# Patient Record
Sex: FEMALE | Race: WHITE | NOT HISPANIC OR LATINO | Employment: OTHER | URBAN - METROPOLITAN AREA
[De-identification: names, ages, dates, MRNs, and addresses within clinical notes are randomized per-mention and may not be internally consistent; named-entity substitution may affect disease eponyms.]

---

## 2017-01-09 ENCOUNTER — HISTORICAL (OUTPATIENT)
Dept: LAB | Facility: HOSPITAL | Age: 67
End: 2017-01-09

## 2017-07-25 ENCOUNTER — HISTORICAL (OUTPATIENT)
Dept: LAB | Facility: HOSPITAL | Age: 67
End: 2017-07-25

## 2017-07-25 LAB
ABS NEUT (OLG): 3.95 X10(3)/MCL (ref 2.1–9.2)
BASOPHILS # BLD AUTO: 0 X10(3)/MCL (ref 0–0.2)
BASOPHILS NFR BLD AUTO: 0 %
DEPRECATED CALCIDIOL+CALCIFEROL SERPL-MC: 47.36 NG/ML (ref 30–80)
EOSINOPHIL # BLD AUTO: 0.3 X10(3)/MCL (ref 0–0.9)
EOSINOPHIL NFR BLD AUTO: 5 %
ERYTHROCYTE [DISTWIDTH] IN BLOOD BY AUTOMATED COUNT: 14 % (ref 11.5–17)
HCT VFR BLD AUTO: 39.1 % (ref 37–47)
HGB BLD-MCNC: 12.2 GM/DL (ref 12–16)
LYMPHOCYTES # BLD AUTO: 1.5 X10(3)/MCL (ref 0.6–4.6)
LYMPHOCYTES NFR BLD AUTO: 23 %
MCH RBC QN AUTO: 29.3 PG (ref 27–31)
MCHC RBC AUTO-ENTMCNC: 31.2 GM/DL (ref 33–36)
MCV RBC AUTO: 94 FL (ref 80–94)
MONOCYTES # BLD AUTO: 0.6 X10(3)/MCL (ref 0.1–1.3)
MONOCYTES NFR BLD AUTO: 9 %
NEUTROPHILS # BLD AUTO: 3.95 X10(3)/MCL (ref 2.1–9.2)
NEUTROPHILS NFR BLD AUTO: 62 %
PLATELET # BLD AUTO: 264 X10(3)/MCL (ref 130–400)
PMV BLD AUTO: 9.7 FL (ref 9.4–12.4)
RBC # BLD AUTO: 4.16 X10(6)/MCL (ref 4.2–5.4)
TSH SERPL-ACNC: 3.34 MIU/ML (ref 0.36–3.74)
WBC # SPEC AUTO: 6.4 X10(3)/MCL (ref 4.5–11.5)

## 2018-12-12 ENCOUNTER — HISTORICAL (OUTPATIENT)
Dept: LAB | Facility: HOSPITAL | Age: 68
End: 2018-12-12

## 2018-12-12 LAB — DEPRECATED CALCIDIOL+CALCIFEROL SERPL-MC: 70.04 NG/ML (ref 30–80)

## 2019-07-22 ENCOUNTER — HISTORICAL (OUTPATIENT)
Dept: LAB | Facility: HOSPITAL | Age: 69
End: 2019-07-22

## 2019-07-22 LAB
ABS NEUT (OLG): 4.17 X10(3)/MCL (ref 2.1–9.2)
BASOPHILS # BLD AUTO: 0 X10(3)/MCL (ref 0–0.2)
BASOPHILS NFR BLD AUTO: 1 %
DEPRECATED CALCIDIOL+CALCIFEROL SERPL-MC: 57.88 NG/ML (ref 30–80)
EOSINOPHIL # BLD AUTO: 0.3 X10(3)/MCL (ref 0–0.9)
EOSINOPHIL NFR BLD AUTO: 4 %
ERYTHROCYTE [DISTWIDTH] IN BLOOD BY AUTOMATED COUNT: 13.5 % (ref 11.5–17)
HCT VFR BLD AUTO: 43.2 % (ref 37–47)
HGB BLD-MCNC: 13.3 GM/DL (ref 12–16)
LYMPHOCYTES # BLD AUTO: 1.7 X10(3)/MCL (ref 0.6–4.6)
LYMPHOCYTES NFR BLD AUTO: 25 %
MCH RBC QN AUTO: 29.3 PG (ref 27–31)
MCHC RBC AUTO-ENTMCNC: 30.8 GM/DL (ref 33–36)
MCV RBC AUTO: 95.2 FL (ref 80–94)
MONOCYTES # BLD AUTO: 0.6 X10(3)/MCL (ref 0.1–1.3)
MONOCYTES NFR BLD AUTO: 8 %
NEUTROPHILS # BLD AUTO: 4.17 X10(3)/MCL (ref 2.1–9.2)
NEUTROPHILS NFR BLD AUTO: 62 %
PLATELET # BLD AUTO: 246 X10(3)/MCL (ref 130–400)
PMV BLD AUTO: 9.5 FL (ref 9.4–12.4)
RBC # BLD AUTO: 4.54 X10(6)/MCL (ref 4.2–5.4)
TSH SERPL-ACNC: 1.09 MIU/L (ref 0.36–3.74)
WBC # SPEC AUTO: 6.7 X10(3)/MCL (ref 4.5–11.5)

## 2019-09-25 LAB — CRC RECOMMENDATION EXT: NORMAL

## 2019-11-07 ENCOUNTER — HISTORICAL (OUTPATIENT)
Dept: LAB | Facility: HOSPITAL | Age: 69
End: 2019-11-07

## 2019-11-07 LAB — DEPRECATED CALCIDIOL+CALCIFEROL SERPL-MC: 67.33 NG/ML (ref 30–80)

## 2020-04-22 ENCOUNTER — HISTORICAL (OUTPATIENT)
Dept: PHYSICAL THERAPY | Facility: HOSPITAL | Age: 70
End: 2020-04-22

## 2020-04-24 ENCOUNTER — HISTORICAL (OUTPATIENT)
Dept: PHYSICAL THERAPY | Facility: HOSPITAL | Age: 70
End: 2020-04-24

## 2020-04-27 ENCOUNTER — HISTORICAL (OUTPATIENT)
Dept: PHYSICAL THERAPY | Facility: HOSPITAL | Age: 70
End: 2020-04-27

## 2020-05-01 ENCOUNTER — HISTORICAL (OUTPATIENT)
Dept: PHYSICAL THERAPY | Facility: HOSPITAL | Age: 70
End: 2020-05-01

## 2020-05-06 ENCOUNTER — HISTORICAL (OUTPATIENT)
Dept: PHYSICAL THERAPY | Facility: HOSPITAL | Age: 70
End: 2020-05-06

## 2020-05-08 ENCOUNTER — HISTORICAL (OUTPATIENT)
Dept: PHYSICAL THERAPY | Facility: HOSPITAL | Age: 70
End: 2020-05-08

## 2020-05-13 ENCOUNTER — HISTORICAL (OUTPATIENT)
Dept: PHYSICAL THERAPY | Facility: HOSPITAL | Age: 70
End: 2020-05-13

## 2020-05-15 ENCOUNTER — HISTORICAL (OUTPATIENT)
Dept: PHYSICAL THERAPY | Facility: HOSPITAL | Age: 70
End: 2020-05-15

## 2020-05-20 ENCOUNTER — HISTORICAL (OUTPATIENT)
Dept: PHYSICAL THERAPY | Facility: HOSPITAL | Age: 70
End: 2020-05-20

## 2020-05-22 ENCOUNTER — HISTORICAL (OUTPATIENT)
Dept: PHYSICAL THERAPY | Facility: HOSPITAL | Age: 70
End: 2020-05-22

## 2020-05-27 ENCOUNTER — HISTORICAL (OUTPATIENT)
Dept: PHYSICAL THERAPY | Facility: HOSPITAL | Age: 70
End: 2020-05-27

## 2020-05-29 ENCOUNTER — HISTORICAL (OUTPATIENT)
Dept: PHYSICAL THERAPY | Facility: HOSPITAL | Age: 70
End: 2020-05-29

## 2020-07-23 ENCOUNTER — HISTORICAL (OUTPATIENT)
Dept: ADMINISTRATIVE | Facility: HOSPITAL | Age: 70
End: 2020-07-23

## 2020-07-23 LAB
ABS NEUT (OLG): 3.71 X10(3)/MCL (ref 2.1–9.2)
BASOPHILS # BLD AUTO: 0 X10(3)/MCL (ref 0–0.2)
BASOPHILS NFR BLD AUTO: 0 %
EOSINOPHIL # BLD AUTO: 0.4 X10(3)/MCL (ref 0–0.9)
EOSINOPHIL NFR BLD AUTO: 6 %
ERYTHROCYTE [DISTWIDTH] IN BLOOD BY AUTOMATED COUNT: 13.3 % (ref 11.5–17)
HCT VFR BLD AUTO: 40.1 % (ref 37–47)
HGB BLD-MCNC: 12.6 GM/DL (ref 12–16)
LYMPHOCYTES # BLD AUTO: 1.6 X10(3)/MCL (ref 0.6–4.6)
LYMPHOCYTES NFR BLD AUTO: 25 %
MCH RBC QN AUTO: 30.2 PG (ref 27–31)
MCHC RBC AUTO-ENTMCNC: 31.4 GM/DL (ref 33–36)
MCV RBC AUTO: 96.2 FL (ref 80–94)
MONOCYTES # BLD AUTO: 0.6 X10(3)/MCL (ref 0.1–1.3)
MONOCYTES NFR BLD AUTO: 9 %
NEUTROPHILS # BLD AUTO: 3.71 X10(3)/MCL (ref 2.1–9.2)
NEUTROPHILS NFR BLD AUTO: 59 %
PLATELET # BLD AUTO: 239 X10(3)/MCL (ref 130–400)
PMV BLD AUTO: 9.7 FL (ref 9.4–12.4)
RBC # BLD AUTO: 4.17 X10(6)/MCL (ref 4.2–5.4)
TSH SERPL-ACNC: 0.64 UIU/ML (ref 0.35–4.94)
WBC # SPEC AUTO: 6.2 X10(3)/MCL (ref 4.5–11.5)

## 2022-07-20 RX ORDER — INFLUENZA A VIRUS A/NEBRASKA/14/2019 (H1N1) ANTIGEN (MDCK CELL DERIVED, PROPIOLACTONE INACTIVATED), INFLUENZA A VIRUS A/DELAWARE/39/2019 (H3N2) ANTIGEN (MDCK CELL DERIVED, PROPIOLACTONE INACTIVATED), INFLUENZA B VIRUS B/SINGAPORE/INFTT-16-0610/2016 ANTIGEN (MDCK CELL DERIVED, PROPIOLACTONE INACTIVATED), INFLUENZA B VIRUS B/DARWIN/7/2019 ANTIGEN (MDCK CELL DERIVED, PROPIOLACTONE INACTIVATED) 15; 15; 15; 15 UG/.5ML; UG/.5ML; UG/.5ML; UG/.5ML
INJECTION, SUSPENSION INTRAMUSCULAR
COMMUNITY
End: 2023-06-08 | Stop reason: ALTCHOICE

## 2022-07-20 RX ORDER — ROSUVASTATIN CALCIUM 10 MG/1
TABLET, COATED ORAL
COMMUNITY
End: 2023-02-16 | Stop reason: SDUPTHER

## 2022-07-20 RX ORDER — CARBOXYMETHYLCELLULOSE SODIUM 5 MG/ML
SOLUTION/ DROPS OPHTHALMIC
COMMUNITY

## 2022-07-20 RX ORDER — LEVOTHYROXINE SODIUM 100 UG/1
TABLET ORAL
COMMUNITY
End: 2022-08-16 | Stop reason: SDUPTHER

## 2022-07-20 RX ORDER — ESTRADIOL 1 MG/1
TABLET ORAL
COMMUNITY
End: 2023-06-08

## 2022-07-20 RX ORDER — OMEPRAZOLE 20 MG/1
TABLET, DELAYED RELEASE ORAL
COMMUNITY
End: 2023-11-26

## 2022-07-25 ENCOUNTER — APPOINTMENT (OUTPATIENT)
Dept: LAB | Facility: HOSPITAL | Age: 72
End: 2022-07-25
Attending: FAMILY MEDICINE
Payer: MEDICARE

## 2022-07-25 DIAGNOSIS — E78.00 HYPERCHOLESTEREMIA: ICD-10-CM

## 2022-07-25 DIAGNOSIS — R73.03 PRE-DIABETES: ICD-10-CM

## 2022-07-25 DIAGNOSIS — Z00.00 ROUTINE GENERAL MEDICAL EXAMINATION AT A HEALTH CARE FACILITY: Primary | ICD-10-CM

## 2022-07-25 DIAGNOSIS — E55.9 VITAMIN D DEFICIENCY: ICD-10-CM

## 2022-07-25 DIAGNOSIS — E03.4 IDIOPATHIC ATROPHIC HYPOTHYROIDISM: ICD-10-CM

## 2022-07-25 LAB
ALBUMIN SERPL-MCNC: 3.4 GM/DL (ref 3.4–4.8)
ALBUMIN/GLOB SERPL: 1.4 RATIO (ref 1.1–2)
ALP SERPL-CCNC: 68 UNIT/L (ref 40–150)
ALT SERPL-CCNC: 14 UNIT/L (ref 0–55)
APPEARANCE UR: CLEAR
AST SERPL-CCNC: 13 UNIT/L (ref 5–34)
BACTERIA #/AREA URNS AUTO: ABNORMAL /HPF
BASOPHILS # BLD AUTO: 0.04 X10(3)/MCL (ref 0–0.2)
BASOPHILS NFR BLD AUTO: 0.6 %
BILIRUB UR QL STRIP.AUTO: NEGATIVE MG/DL
BILIRUBIN DIRECT+TOT PNL SERPL-MCNC: 0.5 MG/DL
BUN SERPL-MCNC: 16 MG/DL (ref 9.8–20.1)
CALCIUM SERPL-MCNC: 9.1 MG/DL (ref 8.4–10.2)
CHLORIDE SERPL-SCNC: 107 MMOL/L (ref 98–107)
CHOLEST SERPL-MCNC: 153 MG/DL
CHOLEST/HDLC SERPL: 3 {RATIO} (ref 0–5)
CO2 SERPL-SCNC: 23 MMOL/L (ref 23–31)
COLOR UR AUTO: YELLOW
CREAT SERPL-MCNC: 0.87 MG/DL (ref 0.55–1.02)
DEPRECATED CALCIDIOL+CALCIFEROL SERPL-MC: 83.8 NG/ML (ref 30–80)
EOSINOPHIL # BLD AUTO: 0.52 X10(3)/MCL (ref 0–0.9)
EOSINOPHIL NFR BLD AUTO: 7.9 %
ERYTHROCYTE [DISTWIDTH] IN BLOOD BY AUTOMATED COUNT: 13.5 % (ref 11.5–17)
EST. AVERAGE GLUCOSE BLD GHB EST-MCNC: 137 MG/DL
GLOBULIN SER-MCNC: 2.5 GM/DL (ref 2.4–3.5)
GLUCOSE SERPL-MCNC: 102 MG/DL (ref 82–115)
GLUCOSE UR QL STRIP.AUTO: NEGATIVE MG/DL
HBA1C MFR BLD: 6.4 %
HCT VFR BLD AUTO: 37.9 % (ref 37–47)
HDLC SERPL-MCNC: 57 MG/DL (ref 35–60)
HGB BLD-MCNC: 12.1 GM/DL (ref 12–16)
IMM GRANULOCYTES # BLD AUTO: 0.02 X10(3)/MCL (ref 0–0.04)
IMM GRANULOCYTES NFR BLD AUTO: 0.3 %
KETONES UR QL STRIP.AUTO: NEGATIVE MG/DL
LDLC SERPL CALC-MCNC: 71 MG/DL (ref 50–140)
LEUKOCYTE ESTERASE UR QL STRIP.AUTO: NEGATIVE UNIT/L
LYMPHOCYTES # BLD AUTO: 1.45 X10(3)/MCL (ref 0.6–4.6)
LYMPHOCYTES NFR BLD AUTO: 22 %
MCH RBC QN AUTO: 30.5 PG (ref 27–31)
MCHC RBC AUTO-ENTMCNC: 31.9 MG/DL (ref 33–36)
MCV RBC AUTO: 95.5 FL (ref 80–94)
MONOCYTES # BLD AUTO: 0.51 X10(3)/MCL (ref 0.1–1.3)
MONOCYTES NFR BLD AUTO: 7.8 %
NEUTROPHILS # BLD AUTO: 4 X10(3)/MCL (ref 2.1–9.2)
NEUTROPHILS NFR BLD AUTO: 61.4 %
NITRITE UR QL STRIP.AUTO: NEGATIVE
NRBC BLD AUTO-RTO: 0 %
PH UR STRIP.AUTO: 7 [PH]
PLATELET # BLD AUTO: 223 X10(3)/MCL (ref 130–400)
PMV BLD AUTO: 9.7 FL (ref 7.4–10.4)
POTASSIUM SERPL-SCNC: 4.3 MMOL/L (ref 3.5–5.1)
PROT SERPL-MCNC: 5.9 GM/DL (ref 5.8–7.6)
PROT UR QL STRIP.AUTO: NEGATIVE MG/DL
RBC # BLD AUTO: 3.97 X10(6)/MCL (ref 4.2–5.4)
RBC #/AREA URNS AUTO: <5 /HPF
RBC UR QL AUTO: NEGATIVE UNIT/L
SODIUM SERPL-SCNC: 141 MMOL/L (ref 136–145)
SP GR UR STRIP.AUTO: 1.01 (ref 1–1.03)
SQUAMOUS #/AREA URNS AUTO: 6 /HPF
T3FREE SERPL-MCNC: 2.5 PG/ML (ref 1.57–3.91)
T4 FREE SERPL-MCNC: 0.92 NG/DL (ref 0.7–1.48)
TRIGL SERPL-MCNC: 123 MG/DL (ref 37–140)
TSH SERPL-ACNC: 1.12 UIU/ML (ref 0.35–4.94)
UROBILINOGEN UR STRIP-ACNC: 0.2 MG/DL
VLDLC SERPL CALC-MCNC: 25 MG/DL
WBC # SPEC AUTO: 6.6 X10(3)/MCL (ref 4.5–11.5)
WBC #/AREA URNS AUTO: <5 /HPF

## 2022-07-25 PROCEDURE — 80061 LIPID PANEL: CPT

## 2022-07-25 PROCEDURE — 85025 COMPLETE CBC W/AUTO DIFF WBC: CPT

## 2022-07-25 PROCEDURE — 84439 ASSAY OF FREE THYROXINE: CPT

## 2022-07-25 PROCEDURE — 84443 ASSAY THYROID STIM HORMONE: CPT

## 2022-07-25 PROCEDURE — 81001 URINALYSIS AUTO W/SCOPE: CPT

## 2022-07-25 PROCEDURE — 82306 VITAMIN D 25 HYDROXY: CPT

## 2022-07-25 PROCEDURE — 80053 COMPREHEN METABOLIC PANEL: CPT

## 2022-07-25 PROCEDURE — 36415 COLL VENOUS BLD VENIPUNCTURE: CPT

## 2022-07-25 PROCEDURE — 83036 HEMOGLOBIN GLYCOSYLATED A1C: CPT

## 2022-07-25 PROCEDURE — 84481 FREE ASSAY (FT-3): CPT

## 2022-08-02 ENCOUNTER — OFFICE VISIT (OUTPATIENT)
Dept: PRIMARY CARE CLINIC | Facility: CLINIC | Age: 72
End: 2022-08-02
Payer: MEDICARE

## 2022-08-02 VITALS
DIASTOLIC BLOOD PRESSURE: 78 MMHG | OXYGEN SATURATION: 96 % | WEIGHT: 147 LBS | HEART RATE: 74 BPM | SYSTOLIC BLOOD PRESSURE: 144 MMHG

## 2022-08-02 DIAGNOSIS — Z00.00 WELLNESS EXAMINATION: ICD-10-CM

## 2022-08-02 DIAGNOSIS — R73.02 IGT (IMPAIRED GLUCOSE TOLERANCE): Primary | ICD-10-CM

## 2022-08-02 PROCEDURE — G0439 PPPS, SUBSEQ VISIT: HCPCS | Mod: ,,, | Performed by: FAMILY MEDICINE

## 2022-08-02 PROCEDURE — G0439 PR MEDICARE ANNUAL WELLNESS SUBSEQUENT VISIT: ICD-10-PCS | Mod: ,,, | Performed by: FAMILY MEDICINE

## 2022-08-02 RX ORDER — METFORMIN HYDROCHLORIDE 500 MG/1
500 TABLET, EXTENDED RELEASE ORAL
Qty: 90 TABLET | Refills: 3 | Status: SHIPPED | OUTPATIENT
Start: 2022-08-02 | End: 2022-11-03

## 2022-08-02 NOTE — PROGRESS NOTES
Chief Complaint  Chief Complaint   Patient presents with    Annual Exam    Medicare Diavibe       History of Present Illness  Patient presents clinic for routine follow-up visit with lab review and wellness check.  Lab work performed prior to this visit includes hemoglobin A1c of 6.4% which is similar to previous finding with patient reporting prior known history of IGT with borderline levels in the past.  FLP performed with LDL at 81 with compliance was statin drug as detailed below and thyroid function studies within normal range with CBC and CMP with no overly concerning findings.  The patient did admit that in the 2 weeks preceding her lab review she did have a corticosteroid injection for a dermatitis reaction in her skin.  She denies any other acute complaints at this time and states that she feels overall well.    PMH:  iGT-hemoglobin A1c 6.4%    Specialist:  Gyn-VISH Valdivia, uterine prolapse    Review of Systems  Review of Systems   Constitutional: Negative for chills and fever.   HENT: Negative for congestion and sore throat.    Eyes: Negative for redness and visual disturbance.   Respiratory: Negative for cough and shortness of breath.    Cardiovascular: Negative for chest pain and palpitations.   Gastrointestinal: Negative for nausea and vomiting.   Musculoskeletal: Negative for arthralgias and myalgias.   Skin: Negative for pallor and rash.   Neurological: Negative for dizziness and headaches.   Psychiatric/Behavioral: Negative for agitation and dysphoric mood.       Physical Exam  Physical Exam  Constitutional:       Appearance: Normal appearance.   HENT:      Head: Normocephalic and atraumatic.   Eyes:      General: No scleral icterus.     Conjunctiva/sclera: Conjunctivae normal.   Cardiovascular:      Rate and Rhythm: Normal rate and regular rhythm.   Pulmonary:      Effort: Pulmonary effort is normal.      Breath sounds: Normal breath sounds.   Skin:     General: Skin is warm and dry.    Neurological:      General: No focal deficit present.      Mental Status: She is alert and oriented to person, place, and time.   Psychiatric:         Mood and Affect: Mood normal.         Behavior: Behavior normal.         Problem List/Past Medical History  Past Medical History:   Diagnosis Date    BPV (benign positional vertigo)     CAD (coronary artery disease)     Diverticulosis     Gastric reflux     Hypercholesterolemia     Hypothyroidism, unspecified     Osteopenia     Tinnitus, unspecified ear     Vitamin D deficiency        Procedure/Surgical History  Past Surgical History:   Procedure Laterality Date    ESOPHAGOGASTRODUODENOSCOPY      hemorrhoidal banding      HYSTERECTOMY         Medications  Current Outpatient Medications on File Prior to Visit   Medication Sig Dispense Refill    carboxymethylcellulose (REFRESH PLUS) 0.5 % Dpet Refresh Tears 0.5 % eye drops   Apply 1 drop every day by ophthalmic route.      estradioL (ESTRACE) 1 MG tablet Estrace 1 mg tablet   Take 1 tablet every day by oral route as directed.      levothyroxine (SYNTHROID) 100 MCG tablet Synthroid 100 mcg tablet   TAKE 1 TABLET BY MOUTH EVERY DAY FOR THYRIOD      omeprazole (PRILOSEC OTC) 20 MG tablet Prilosec OTC 20 mg tablet,delayed release   Take 1 tablet every day by oral route as directed.      rosuvastatin (CRESTOR) 10 MG tablet rosuvastatin 10 mg tablet   Take 1 tablet every day by oral route.      flu vac qs 2020,4 yr up,CD,PF, (FLUCELVAX QUAD 2634-3838, PF,) 60 mcg (15 mcg x 4)/0.5 mL Syrg Flucelvax Quad 9910-2196 (PF) 60 mcg (15 mcg x 4)/0.5 mL IM syringe   ADM 0.5ML IM UTD       No current facility-administered medications on file prior to visit.       Anna Marie  Review of patient's allergies indicates:  Not on File     Social History  Social History     Socioeconomic History    Marital status:    Tobacco Use    Smoking status: Never Smoker    Smokeless tobacco: Never Used       Family  History  History reviewed. No pertinent family history.      Immunizations    There is no immunization history on file for this patient.    Health Maintenance  Health Maintenance   Topic Date Due    Hepatitis C Screening  Never done    TETANUS VACCINE  Never done    Mammogram  11/10/2022    High Dose Statin  08/02/2023    DEXA Scan  10/28/2024    Lipid Panel  07/25/2027        Assessment / Plan  Problem List Items Addressed This Visit        Endocrine    IGT (impaired glucose tolerance) - Primary    Current Assessment & Plan     Current levels of hemoglobin A1c at 6.4% bordering between high iGT and diagnosis of diabetes mellitus.  Discussed initiation of metformin 500 mg XR daily for prevention of progression and diabetic range with ADA diet recommended as well and recheck levels at follow-up              Other    Wellness examination    Current Assessment & Plan     Discussed routine annual health maintenance and screening in addition to acute issues noted below.                 RTC 3 months    Chapo Valdivia

## 2022-08-02 NOTE — ASSESSMENT & PLAN NOTE
Current levels of hemoglobin A1c at 6.4% bordering between high iGT and diagnosis of diabetes mellitus.  Discussed initiation of metformin 500 mg XR daily for prevention of progression and diabetic range with ADA diet recommended as well and recheck levels at follow-up

## 2022-08-16 DIAGNOSIS — E03.9 HYPOTHYROIDISM, UNSPECIFIED TYPE: Primary | ICD-10-CM

## 2022-08-16 RX ORDER — LEVOTHYROXINE SODIUM 100 UG/1
100 TABLET ORAL
Qty: 30 TABLET | Refills: 0 | Status: SHIPPED | OUTPATIENT
Start: 2022-08-16 | End: 2022-09-16

## 2022-09-23 ENCOUNTER — DOCUMENTATION ONLY (OUTPATIENT)
Dept: ADMINISTRATIVE | Facility: HOSPITAL | Age: 72
End: 2022-09-23
Payer: COMMERCIAL

## 2022-10-26 ENCOUNTER — LAB VISIT (OUTPATIENT)
Dept: LAB | Facility: HOSPITAL | Age: 72
End: 2022-10-26
Attending: FAMILY MEDICINE
Payer: MEDICARE

## 2022-10-26 DIAGNOSIS — R73.02 IGT (IMPAIRED GLUCOSE TOLERANCE): ICD-10-CM

## 2022-10-26 LAB
EST. AVERAGE GLUCOSE BLD GHB EST-MCNC: 122.6 MG/DL
HBA1C MFR BLD: 5.9 %

## 2022-10-26 PROCEDURE — 36415 COLL VENOUS BLD VENIPUNCTURE: CPT

## 2022-10-26 PROCEDURE — 83036 HEMOGLOBIN GLYCOSYLATED A1C: CPT

## 2022-11-03 ENCOUNTER — OFFICE VISIT (OUTPATIENT)
Dept: PRIMARY CARE CLINIC | Facility: CLINIC | Age: 72
End: 2022-11-03
Payer: MEDICARE

## 2022-11-03 VITALS
OXYGEN SATURATION: 98 % | WEIGHT: 142.63 LBS | HEART RATE: 81 BPM | DIASTOLIC BLOOD PRESSURE: 65 MMHG | SYSTOLIC BLOOD PRESSURE: 121 MMHG

## 2022-11-03 DIAGNOSIS — D64.9 ANEMIA, UNSPECIFIED TYPE: ICD-10-CM

## 2022-11-03 DIAGNOSIS — R73.02 IGT (IMPAIRED GLUCOSE TOLERANCE): ICD-10-CM

## 2022-11-03 DIAGNOSIS — Z23 NEED FOR VACCINATION: ICD-10-CM

## 2022-11-03 DIAGNOSIS — E03.9 HYPOTHYROIDISM, UNSPECIFIED TYPE: ICD-10-CM

## 2022-11-03 DIAGNOSIS — E55.9 VITAMIN D DEFICIENCY: ICD-10-CM

## 2022-11-03 DIAGNOSIS — Z00.00 WELLNESS EXAMINATION: Primary | ICD-10-CM

## 2022-11-03 DIAGNOSIS — Z12.31 BREAST CANCER SCREENING BY MAMMOGRAM: ICD-10-CM

## 2022-11-03 PROCEDURE — 99214 PR OFFICE/OUTPT VISIT, EST, LEVL IV, 30-39 MIN: ICD-10-PCS | Mod: ,,, | Performed by: FAMILY MEDICINE

## 2022-11-03 PROCEDURE — G0008 ADMIN INFLUENZA VIRUS VAC: HCPCS | Mod: ,,, | Performed by: FAMILY MEDICINE

## 2022-11-03 PROCEDURE — 90694 FLU VACCINE - QUADRIVALENT - ADJUVANTED: ICD-10-PCS | Mod: ,,, | Performed by: FAMILY MEDICINE

## 2022-11-03 PROCEDURE — 99214 OFFICE O/P EST MOD 30 MIN: CPT | Mod: ,,, | Performed by: FAMILY MEDICINE

## 2022-11-03 PROCEDURE — 90694 VACC AIIV4 NO PRSRV 0.5ML IM: CPT | Mod: ,,, | Performed by: FAMILY MEDICINE

## 2022-11-03 PROCEDURE — G0008 FLU VACCINE - QUADRIVALENT - ADJUVANTED: ICD-10-PCS | Mod: ,,, | Performed by: FAMILY MEDICINE

## 2022-11-03 RX ORDER — NITROFURANTOIN 25; 75 MG/1; MG/1
100 CAPSULE ORAL 2 TIMES DAILY
COMMUNITY
End: 2023-06-08

## 2022-11-03 NOTE — PROGRESS NOTES
Chief Complaint  Chief Complaint   Patient presents with    Follow-up     With labs       History of Present Illness  Patient presents clinic for routine follow-up visit with lab review. hemoglobin A1c of 5.9% improved from 6.4% at prior visit with diet adjustment alone. BP WNL and denies acute complaints or concerns today.    PMH:  iGT-hemoglobin A1c 6.4%, metformin caused itching and aches  Hypothyroidism    Specialist:  Gyn-VISH Valdivia, uterine prolapse    Review of Systems  Review of Systems   Constitutional:  Negative for chills and fever.   HENT:  Negative for congestion and sore throat.    Eyes:  Negative for redness and visual disturbance.   Respiratory:  Negative for cough and shortness of breath.    Cardiovascular:  Negative for chest pain and palpitations.   Gastrointestinal:  Negative for nausea and vomiting.   Musculoskeletal:  Negative for arthralgias and myalgias.   Skin:  Negative for pallor and rash.   Neurological:  Negative for dizziness and headaches.   Psychiatric/Behavioral:  Negative for agitation and dysphoric mood.      Physical Exam  Physical Exam  Constitutional:       Appearance: Normal appearance.   HENT:      Head: Normocephalic and atraumatic.   Eyes:      General: No scleral icterus.     Conjunctiva/sclera: Conjunctivae normal.   Cardiovascular:      Rate and Rhythm: Normal rate and regular rhythm.   Pulmonary:      Effort: Pulmonary effort is normal.      Breath sounds: Normal breath sounds.   Skin:     General: Skin is warm and dry.   Neurological:      General: No focal deficit present.      Mental Status: She is alert and oriented to person, place, and time.   Psychiatric:         Mood and Affect: Mood normal.         Behavior: Behavior normal.       Problem List/Past Medical History  Past Medical History:   Diagnosis Date    BPV (benign positional vertigo)     CAD (coronary artery disease)     Diverticulosis     Gastric reflux     Hypercholesterolemia     Hypothyroidism,  unspecified     Osteopenia     Tinnitus, unspecified ear     Vitamin D deficiency        Procedure/Surgical History  Past Surgical History:   Procedure Laterality Date    COLONOSCOPY  09/25/2019    ESOPHAGOGASTRODUODENOSCOPY      hemorrhoidal banding      HYSTERECTOMY      PELVIC FRACTURE SURGERY  08/31/2022       Medications  Current Outpatient Medications on File Prior to Visit   Medication Sig Dispense Refill    carboxymethylcellulose (REFRESH PLUS) 0.5 % Dpet Refresh Tears 0.5 % eye drops   Apply 1 drop every day by ophthalmic route.      estradioL (ESTRACE) 1 MG tablet Estrace 1 mg tablet   Take 1 tablet every day by oral route as directed.      flu vac qs 2020,4 yr up,CD,PF, (FLUCELVAX QUAD 0063-4347, PF,) 60 mcg (15 mcg x 4)/0.5 mL Syrg Flucelvax Quad 0190-2660 (PF) 60 mcg (15 mcg x 4)/0.5 mL IM syringe   ADM 0.5ML IM UTD      nitrofurantoin, macrocrystal-monohydrate, (MACROBID) 100 MG capsule Take 100 mg by mouth 2 (two) times daily.      omeprazole (PRILOSEC OTC) 20 MG tablet Prilosec OTC 20 mg tablet,delayed release   Take 1 tablet every day by oral route as directed.      rosuvastatin (CRESTOR) 10 MG tablet rosuvastatin 10 mg tablet   Take 1 tablet every day by oral route.      SYNTHROID 100 mcg tablet TAKE 1 TABLET (100 MCG TOTAL) BY MOUTH BEFORE BREAKFAST. 30 tablet 0    [DISCONTINUED] metFORMIN (GLUCOPHAGE-XR) 500 MG ER 24hr tablet Take 1 tablet (500 mg total) by mouth daily with breakfast. 90 tablet 3     No current facility-administered medications on file prior to visit.       Jose Raulgiolimpia  Review of patient's allergies indicates:  No Known Allergies     Social History  Social History     Socioeconomic History    Marital status:    Tobacco Use    Smoking status: Never    Smokeless tobacco: Never   Substance and Sexual Activity    Alcohol use: Not Currently    Drug use: Never    Sexual activity: Not Currently       Family History  History reviewed. No pertinent family  history.      Immunizations    There is no immunization history on file for this patient.    Health Maintenance  Health Maintenance   Topic Date Due    Hepatitis C Screening  Never done    TETANUS VACCINE  Never done    Mammogram  11/10/2022    High Dose Statin  08/02/2023    DEXA Scan  10/28/2024    Lipid Panel  07/25/2027        Assessment / Plan  Problem List Items Addressed This Visit          Endocrine    IGT (impaired glucose tolerance)    Overview     Continue ADA         Hypothyroidism    Overview     Continue current meds with labs at f/u            Other    Wellness examination - Primary     Other Visit Diagnoses       Vitamin D deficiency        Anemia, unspecified type              RTC 9 months for wellness    Chapo Valdivia

## 2022-11-07 PROBLEM — Z00.00 WELLNESS EXAMINATION: Status: RESOLVED | Noted: 2022-08-02 | Resolved: 2022-11-07

## 2022-12-16 LAB — BCS RECOMMENDATION EXT: NORMAL

## 2022-12-20 ENCOUNTER — DOCUMENTATION ONLY (OUTPATIENT)
Dept: ADMINISTRATIVE | Facility: HOSPITAL | Age: 72
End: 2022-12-20
Payer: COMMERCIAL

## 2022-12-28 ENCOUNTER — DOCUMENTATION ONLY (OUTPATIENT)
Dept: ADMINISTRATIVE | Facility: HOSPITAL | Age: 72
End: 2022-12-28
Payer: COMMERCIAL

## 2023-02-16 DIAGNOSIS — E78.00 HIGH CHOLESTEROL: Primary | ICD-10-CM

## 2023-02-16 DIAGNOSIS — E03.9 HYPOTHYROIDISM, UNSPECIFIED TYPE: ICD-10-CM

## 2023-02-16 RX ORDER — LEVOTHYROXINE SODIUM 100 UG/1
TABLET ORAL
Qty: 30 TABLET | Refills: 0 | Status: SHIPPED | OUTPATIENT
Start: 2023-02-16 | End: 2023-03-16

## 2023-02-16 RX ORDER — ROSUVASTATIN CALCIUM 10 MG/1
TABLET, COATED ORAL
Qty: 90 TABLET | Refills: 3 | OUTPATIENT
Start: 2023-02-16 | End: 2023-02-20 | Stop reason: SDUPTHER

## 2023-02-20 DIAGNOSIS — E78.00 HIGH CHOLESTEROL: ICD-10-CM

## 2023-02-20 RX ORDER — ROSUVASTATIN CALCIUM 10 MG/1
TABLET, COATED ORAL
Qty: 90 TABLET | Refills: 3 | OUTPATIENT
Start: 2023-02-20 | End: 2023-02-23 | Stop reason: SDUPTHER

## 2023-02-23 ENCOUNTER — TELEPHONE (OUTPATIENT)
Dept: PRIMARY CARE CLINIC | Facility: CLINIC | Age: 73
End: 2023-02-23
Payer: COMMERCIAL

## 2023-02-23 DIAGNOSIS — E78.00 HIGH CHOLESTEROL: ICD-10-CM

## 2023-02-23 RX ORDER — ROSUVASTATIN CALCIUM 10 MG/1
TABLET, COATED ORAL
Qty: 90 TABLET | Refills: 3 | OUTPATIENT
Start: 2023-02-23 | End: 2023-11-08

## 2023-02-23 RX ORDER — ROSUVASTATIN CALCIUM 10 MG/1
TABLET, COATED ORAL
Qty: 90 TABLET | Refills: 3 | OUTPATIENT
Start: 2023-02-23 | End: 2023-02-23 | Stop reason: SDUPTHER

## 2023-04-19 DIAGNOSIS — E03.9 HYPOTHYROIDISM, UNSPECIFIED TYPE: ICD-10-CM

## 2023-04-19 RX ORDER — LEVOTHYROXINE SODIUM 100 UG/1
TABLET ORAL
Qty: 30 TABLET | Refills: 0 | Status: SHIPPED | OUTPATIENT
Start: 2023-04-19 | End: 2023-05-19

## 2023-05-19 DIAGNOSIS — E03.9 HYPOTHYROIDISM, UNSPECIFIED TYPE: ICD-10-CM

## 2023-05-19 RX ORDER — LEVOTHYROXINE SODIUM 100 UG/1
TABLET ORAL
Qty: 30 TABLET | Refills: 0 | Status: SHIPPED | OUTPATIENT
Start: 2023-05-19 | End: 2023-06-20 | Stop reason: SDUPTHER

## 2023-06-08 ENCOUNTER — OFFICE VISIT (OUTPATIENT)
Dept: PRIMARY CARE CLINIC | Facility: CLINIC | Age: 73
End: 2023-06-08
Payer: MEDICARE

## 2023-06-08 VITALS
DIASTOLIC BLOOD PRESSURE: 70 MMHG | RESPIRATION RATE: 20 BRPM | BODY MASS INDEX: 26.87 KG/M2 | TEMPERATURE: 98 F | OXYGEN SATURATION: 98 % | WEIGHT: 146 LBS | SYSTOLIC BLOOD PRESSURE: 117 MMHG | HEIGHT: 62 IN | HEART RATE: 74 BPM

## 2023-06-08 DIAGNOSIS — R73.03 PREDIABETES: Chronic | ICD-10-CM

## 2023-06-08 DIAGNOSIS — Z00.00 WELLNESS EXAMINATION: ICD-10-CM

## 2023-06-08 DIAGNOSIS — Z76.89 ENCOUNTER TO ESTABLISH CARE WITH NEW DOCTOR: Primary | ICD-10-CM

## 2023-06-08 DIAGNOSIS — E03.9 HYPOTHYROIDISM, UNSPECIFIED TYPE: Chronic | ICD-10-CM

## 2023-06-08 DIAGNOSIS — E78.5 HYPERLIPIDEMIA, UNSPECIFIED HYPERLIPIDEMIA TYPE: Chronic | ICD-10-CM

## 2023-06-08 DIAGNOSIS — E55.9 VITAMIN D DEFICIENCY: Chronic | ICD-10-CM

## 2023-06-08 PROBLEM — N39.41 URGE URINARY INCONTINENCE: Status: ACTIVE | Noted: 2023-06-08

## 2023-06-08 PROBLEM — J30.9 ALLERGIC RHINITIS: Status: ACTIVE | Noted: 2023-06-08

## 2023-06-08 PROBLEM — I25.10 ARTERIOSCLEROSIS OF CORONARY ARTERY: Chronic | Status: ACTIVE | Noted: 2023-06-08

## 2023-06-08 PROBLEM — J84.10 POSTINFLAMMATORY PULMONARY FIBROSIS: Status: RESOLVED | Noted: 2023-06-08 | Resolved: 2023-06-08

## 2023-06-08 PROBLEM — H93.19 TINNITUS: Status: ACTIVE | Noted: 2021-01-18

## 2023-06-08 PROBLEM — I25.10 ARTERIOSCLEROSIS OF CORONARY ARTERY: Status: ACTIVE | Noted: 2023-06-08

## 2023-06-08 PROBLEM — H81.10 BENIGN PAROXYSMAL POSITIONAL VERTIGO: Status: ACTIVE | Noted: 2023-06-08

## 2023-06-08 PROBLEM — K57.90 DIVERTICULAR DISEASE: Status: ACTIVE | Noted: 2023-06-08

## 2023-06-08 PROBLEM — J84.10 POSTINFLAMMATORY PULMONARY FIBROSIS: Status: ACTIVE | Noted: 2023-06-08

## 2023-06-08 PROBLEM — H90.3 SENSORINEURAL HEARING LOSS (SNHL) OF BOTH EARS: Status: ACTIVE | Noted: 2023-06-08

## 2023-06-08 PROBLEM — K58.0 IRRITABLE BOWEL SYNDROME WITH DIARRHEA: Status: ACTIVE | Noted: 2022-01-18

## 2023-06-08 PROBLEM — K21.9 GASTROESOPHAGEAL REFLUX DISEASE: Status: ACTIVE | Noted: 2021-01-18

## 2023-06-08 PROBLEM — M85.80 OSTEOPENIA: Status: ACTIVE | Noted: 2021-01-18

## 2023-06-08 PROBLEM — N39.41 URGE URINARY INCONTINENCE: Chronic | Status: ACTIVE | Noted: 2023-06-08

## 2023-06-08 PROCEDURE — 99387 INIT PM E/M NEW PAT 65+ YRS: CPT | Mod: GZ,,, | Performed by: STUDENT IN AN ORGANIZED HEALTH CARE EDUCATION/TRAINING PROGRAM

## 2023-06-08 PROCEDURE — 99387 PR PREVENTIVE VISIT,NEW,65 & OVER: ICD-10-PCS | Mod: GZ,,, | Performed by: STUDENT IN AN ORGANIZED HEALTH CARE EDUCATION/TRAINING PROGRAM

## 2023-06-08 RX ORDER — ESTRADIOL 0.1 MG/G
1 CREAM VAGINAL
COMMUNITY

## 2023-06-08 RX ORDER — SOLIFENACIN SUCCINATE 10 MG/1
TABLET, FILM COATED ORAL
COMMUNITY
Start: 2023-05-18 | End: 2023-08-08

## 2023-06-08 RX ORDER — CLOBETASOL PROPIONATE 0.5 MG/G
OINTMENT TOPICAL
COMMUNITY
Start: 2022-04-28

## 2023-06-08 RX ORDER — HYDROCORTISONE 25 MG/G
CREAM TOPICAL
COMMUNITY
Start: 2022-07-11 | End: 2024-03-15

## 2023-06-08 NOTE — ASSESSMENT & PLAN NOTE
Stable, continue Levothyroxine   Last TSH WNL, ordered for upcoming wellness visit   Encouraged strict medication compliance (take in the AM on an empty stomach with a full glass of water, no food/drink or other medications for >30 minutes)

## 2023-06-08 NOTE — ASSESSMENT & PLAN NOTE
Most recent A1c was 5.9  Continue Statin   Currently diet/lifestyle controlled, repeat for upcoming wellness visit   Continue ADA diet, Counseled on importance of diet & weight loss

## 2023-06-08 NOTE — ASSESSMENT & PLAN NOTE
Ordered Lipid Panel, then will obtain ASCVD 10 year risk.   Continue Crestor daily   Counseled on dietary modifications  Counseled to exercise 150 minutes weekly as exercise raises HDL and lowers LDL

## 2023-06-08 NOTE — PROGRESS NOTES
Subjective:       Patient ID: Evelyn Frances is a 73 y.o. female.    Past Medical History:   BPV (benign positional vertigo)  CAD (coronary artery disease)  Diverticulosis  Gastric reflux  Hypercholesterolemia  Hypothyroidism, unspecified  Osteopenia  Postinflammatory pulmonary fibrosis  Tinnitus, unspecified ear  Vitamin D deficiency     Chief Complaint: Establish Care    Presents to the clinic to establish care. Last Wellness visit was in October. No acute complaints. Doesn't need medication refills at this time. Overall doing well, just wanted to establish care.     Review of Systems   Constitutional:  Negative for chills and fever.   Respiratory:  Negative for cough.    Cardiovascular:  Negative for chest pain.   Gastrointestinal:  Negative for abdominal pain, diarrhea, nausea and vomiting.   Genitourinary:  Negative for dysuria and hematuria.   Neurological:  Negative for syncope and weakness.       Objective:      Physical Exam  Vitals and nursing note reviewed.   Constitutional:       General: She is not in acute distress.     Appearance: Normal appearance. She is not ill-appearing.   Eyes:      General: No scleral icterus.     Extraocular Movements: Extraocular movements intact.      Conjunctiva/sclera: Conjunctivae normal.      Pupils: Pupils are equal, round, and reactive to light.   Cardiovascular:      Rate and Rhythm: Normal rate and regular rhythm.      Pulses: Normal pulses.      Heart sounds: Normal heart sounds. No murmur heard.  Pulmonary:      Effort: Pulmonary effort is normal. No respiratory distress.      Breath sounds: Normal breath sounds. No wheezing.   Abdominal:      General: There is no distension.      Palpations: Abdomen is soft.      Tenderness: There is no abdominal tenderness.   Musculoskeletal:      Right lower leg: No edema.      Left lower leg: No edema.   Skin:     General: Skin is warm.      Coloration: Skin is not jaundiced.   Neurological:      Mental Status: She is alert.  Mental status is at baseline.      Gait: Gait normal.   Psychiatric:         Mood and Affect: Mood normal.         Behavior: Behavior normal.       Assessment & Plan:   1. Encounter to establish care with new doctor  Comments:  Reviewed medical history and reconciled medications   Ordered Wellness Labs   Requested records from previous provider/specialists    2. Vitamin D deficiency  Assessment & Plan:  Stable, continue supplementation   Repeat Level at Wellness Visit   Encouraged moderate sun exposure, increase PO calcium intake    3. Prediabetes  Overview:  Metformin - GI side effects, flu like symptoms  Assessment & Plan:  Most recent A1c was 5.9  Continue Statin   Currently diet/lifestyle controlled, repeat for upcoming wellness visit   Continue ADA diet, Counseled on importance of diet & weight loss     4. Hypothyroidism, unspecified type  Assessment & Plan:  Stable, continue Levothyroxine   Last TSH WNL, ordered for upcoming wellness visit   Encouraged strict medication compliance (take in the AM on an empty stomach with a full glass of water, no food/drink or other medications for >30 minutes)     5. Hyperlipidemia, unspecified hyperlipidemia type  Assessment & Plan:  Ordered Lipid Panel, then will obtain ASCVD 10 year risk.   Continue Crestor daily   Counseled on dietary modifications  Counseled to exercise 150 minutes weekly as exercise raises HDL and lowers LDL     6. Wellness examination  -     CBC Auto Differential; Future; Expected date: 11/06/2023  -     Comprehensive Metabolic Panel; Future; Expected date: 11/06/2023  -     Lipid Panel; Future; Expected date: 11/06/2023  -     Urinalysis; Future; Expected date: 11/06/2023  -     TSH; Future; Expected date: 11/06/2023  -     Hemoglobin A1C; Future; Expected date: 11/06/2023  -     Vitamin D; Future; Expected date: 11/06/2023  -     Hepatitis C Antibody; Future; Expected date: 11/06/2023    Follow up in about 5 months (around 11/8/2023) for Wellness. In  addition to their scheduled follow up, the patient has also been instructed to follow up on as needed basis.

## 2023-06-08 NOTE — ASSESSMENT & PLAN NOTE
Stable, continue supplementation   Repeat Level at Wellness Visit   Encouraged moderate sun exposure, increase PO calcium intake

## 2023-06-20 ENCOUNTER — TELEPHONE (OUTPATIENT)
Dept: PRIMARY CARE CLINIC | Facility: CLINIC | Age: 73
End: 2023-06-20
Payer: COMMERCIAL

## 2023-06-20 DIAGNOSIS — E03.9 HYPOTHYROIDISM, UNSPECIFIED TYPE: ICD-10-CM

## 2023-06-20 RX ORDER — LEVOTHYROXINE SODIUM 100 UG/1
100 TABLET ORAL
Qty: 30 TABLET | Refills: 0 | Status: SHIPPED | OUTPATIENT
Start: 2023-06-20 | End: 2023-06-28 | Stop reason: SDUPTHER

## 2023-06-20 NOTE — TELEPHONE ENCOUNTER
----- Message from Marely Abreu MD sent at 6/20/2023  4:58 PM CDT -----  Regarding: RE: med refill  Approved, let the patient know!    Marely Abreu MD    ----- Message -----  From: Pradeep Her MA  Sent: 6/20/2023   4:53 PM CDT  To: Marely Abreu MD  Subject: FW: med refill                                   SENT FOR YOUR APPROVAL  ----- Message -----  From: Judieaman Blanco  Sent: 6/20/2023   2:27 PM CDT  To: Lois Yap Staff  Subject: med refill                                       .Type:  RX Refill Request    Who Called: pt  Refill or New Rx:refill  RX Name and Strength:SYNTHROID 100 mcg tablet  How is the patient currently taking it? (ex. 1XDay):1xday  Is this a 30 day or 90 day RX:30  Preferred Pharmacy with phone number:[address]   Phone: [phone]  Fax: [phone]  Local or Mail Order:local  Ordering Provider:Lois Macedo  Would the patient rather a call back or a response via [facility]? Call back  Best Call Back Number:[phone]  Additional Information: pt needs authorization from  to refill prescription pt is completely out

## 2023-06-28 DIAGNOSIS — E03.9 HYPOTHYROIDISM, UNSPECIFIED TYPE: ICD-10-CM

## 2023-06-28 RX ORDER — LEVOTHYROXINE SODIUM 100 UG/1
100 TABLET ORAL
Qty: 30 TABLET | Refills: 0 | Status: SHIPPED | OUTPATIENT
Start: 2023-06-28 | End: 2023-07-20

## 2023-07-20 DIAGNOSIS — E03.9 HYPOTHYROIDISM, UNSPECIFIED TYPE: ICD-10-CM

## 2023-07-20 RX ORDER — LEVOTHYROXINE SODIUM 100 UG/1
100 TABLET ORAL
Qty: 90 TABLET | Refills: 3 | Status: SHIPPED | OUTPATIENT
Start: 2023-07-20

## 2023-07-20 RX ORDER — LEVOTHYROXINE SODIUM 100 UG/1
TABLET ORAL
Qty: 30 TABLET | Refills: 0 | Status: SHIPPED | OUTPATIENT
Start: 2023-07-20 | End: 2023-07-20

## 2023-08-08 ENCOUNTER — OFFICE VISIT (OUTPATIENT)
Dept: PRIMARY CARE CLINIC | Facility: CLINIC | Age: 73
End: 2023-08-08
Payer: MEDICARE

## 2023-08-08 ENCOUNTER — TELEPHONE (OUTPATIENT)
Dept: PRIMARY CARE CLINIC | Facility: CLINIC | Age: 73
End: 2023-08-08

## 2023-08-08 VITALS
HEIGHT: 62 IN | BODY MASS INDEX: 26.87 KG/M2 | OXYGEN SATURATION: 97 % | HEART RATE: 75 BPM | WEIGHT: 146 LBS | SYSTOLIC BLOOD PRESSURE: 139 MMHG | RESPIRATION RATE: 18 BRPM | TEMPERATURE: 97 F | DIASTOLIC BLOOD PRESSURE: 89 MMHG

## 2023-08-08 DIAGNOSIS — I87.2 CHRONIC VENOUS INSUFFICIENCY OF LOWER EXTREMITY: ICD-10-CM

## 2023-08-08 DIAGNOSIS — R10.9 BILATERAL FLANK PAIN: Primary | ICD-10-CM

## 2023-08-08 DIAGNOSIS — Z87.440 HISTORY OF RECURRENT UTI (URINARY TRACT INFECTION): ICD-10-CM

## 2023-08-08 PROBLEM — E87.8 DISEQUILIBRIUM SYNDROME: Status: ACTIVE | Noted: 2023-08-08

## 2023-08-08 PROBLEM — H61.20 IMPACTED CERUMEN: Status: ACTIVE | Noted: 2023-08-08

## 2023-08-08 PROBLEM — J30.1 ALLERGIC RHINITIS DUE TO POLLEN: Status: ACTIVE | Noted: 2023-08-08

## 2023-08-08 PROBLEM — H69.90 EUSTACHIAN TUBE DYSFUNCTION: Status: ACTIVE | Noted: 2023-08-08

## 2023-08-08 LAB
APPEARANCE UR: CLEAR
BACTERIA #/AREA URNS AUTO: ABNORMAL /HPF
BILIRUB SERPL-MCNC: NORMAL MG/DL
BILIRUB UR QL STRIP.AUTO: NEGATIVE
BLOOD URINE, POC: NORMAL
CLARITY, POC UA: NORMAL
COLOR UR: YELLOW
COLOR, POC UA: NORMAL
GLUCOSE UR QL STRIP.AUTO: NEGATIVE
GLUCOSE UR QL STRIP: NORMAL
KETONES UR QL STRIP.AUTO: NEGATIVE
KETONES UR QL STRIP: NORMAL
LEUKOCYTE ESTERASE UR QL STRIP.AUTO: NEGATIVE
LEUKOCYTE ESTERASE URINE, POC: NORMAL
NITRITE UR QL STRIP.AUTO: NEGATIVE
NITRITE, POC UA: NORMAL
PH UR STRIP.AUTO: 6.5 [PH]
PH, POC UA: 6
PROT UR QL STRIP.AUTO: NEGATIVE
PROTEIN, POC: NORMAL
RBC #/AREA URNS AUTO: ABNORMAL /HPF
RBC UR QL AUTO: NEGATIVE
SP GR UR STRIP.AUTO: 1.01 (ref 1–1.03)
SPECIFIC GRAVITY, POC UA: NORMAL
SQUAMOUS #/AREA URNS AUTO: ABNORMAL /HPF
UROBILINOGEN UR STRIP-ACNC: 0.2
UROBILINOGEN, POC UA: NORMAL
WBC #/AREA URNS AUTO: ABNORMAL /HPF

## 2023-08-08 PROCEDURE — 87088 URINE BACTERIA CULTURE: CPT | Performed by: STUDENT IN AN ORGANIZED HEALTH CARE EDUCATION/TRAINING PROGRAM

## 2023-08-08 PROCEDURE — 99213 OFFICE O/P EST LOW 20 MIN: CPT | Mod: ,,, | Performed by: STUDENT IN AN ORGANIZED HEALTH CARE EDUCATION/TRAINING PROGRAM

## 2023-08-08 PROCEDURE — 81001 URINALYSIS AUTO W/SCOPE: CPT | Performed by: STUDENT IN AN ORGANIZED HEALTH CARE EDUCATION/TRAINING PROGRAM

## 2023-08-08 PROCEDURE — 99213 PR OFFICE/OUTPT VISIT, EST, LEVL III, 20-29 MIN: ICD-10-PCS | Mod: ,,, | Performed by: STUDENT IN AN ORGANIZED HEALTH CARE EDUCATION/TRAINING PROGRAM

## 2023-08-08 PROCEDURE — 81002 URINALYSIS NONAUTO W/O SCOPE: CPT | Mod: ,,, | Performed by: STUDENT IN AN ORGANIZED HEALTH CARE EDUCATION/TRAINING PROGRAM

## 2023-08-08 PROCEDURE — 81002 POCT URINE DIPSTICK WITHOUT MICROSCOPE: ICD-10-PCS | Mod: ,,, | Performed by: STUDENT IN AN ORGANIZED HEALTH CARE EDUCATION/TRAINING PROGRAM

## 2023-08-08 RX ORDER — FLUTICASONE PROPIONATE 50 MCG
SPRAY, SUSPENSION (ML) NASAL
COMMUNITY

## 2023-08-08 RX ORDER — MULTIVITAMIN
TABLET ORAL
COMMUNITY

## 2023-08-08 NOTE — ASSESSMENT & PLAN NOTE
Worsening L>R   Had trauma (plantar fascitis on L foot in past)  Encouraged good PO hydration, limit sodium intake, avoid prolonged sitting/standing   Wear compression stockings daily   OK to use steroid/NSAIDS as needed for foot inflammation   If no improvement with conservative measures will send to vascular for further evaluation/management

## 2023-08-08 NOTE — PROGRESS NOTES
"Subjective:       Patient ID: Evelyn Frances is a 73 y.o. female.    Past Medical History:   BPV (benign positional vertigo)  CAD (coronary artery disease)  Diverticulosis  Gastric reflux  Hypercholesterolemia  Hypothyroidism, unspecified  Osteopenia  Postinflammatory pulmonary fibrosis  Tinnitus, unspecified ear  Vitamin D deficiency     Chief Complaint: Edema and Back Pain (Lower- "kidney stone" -per patient)    Presents to the clinic for follow up/same day visit. Endorsed bilateral flank pain, although now improving. Family history of kidney stones. No known personal history of kidney stones. History of recurrent UTI after bladder surgery, last was months ago. Denies any urinary incontinence, dysuria. But concerned about her kidney pain. No hematuria. Also endorsed worsening L lower extremity swelling. History of trauma, plantar fascitis, told in the past that she has vascular issues in the legs that contribute to the swelling. Saw by Dr. Vela.       Review of Systems   Constitutional:  Negative for chills and fever.   Respiratory:  Negative for cough.    Cardiovascular:  Positive for leg swelling.   Genitourinary:  Positive for flank pain. Negative for bladder incontinence, dysuria and hematuria.         Objective:      Physical Exam  Vitals and nursing note reviewed.   Constitutional:       General: She is not in acute distress.     Appearance: Normal appearance. She is not ill-appearing.   HENT:      Mouth/Throat:      Mouth: Mucous membranes are moist.   Eyes:      General: No scleral icterus.     Conjunctiva/sclera: Conjunctivae normal.   Cardiovascular:      Rate and Rhythm: Normal rate and regular rhythm.      Pulses: Normal pulses.      Heart sounds: Normal heart sounds. No murmur heard.  Pulmonary:      Effort: Pulmonary effort is normal. No respiratory distress.      Breath sounds: Normal breath sounds. No wheezing.   Abdominal:      Palpations: Abdomen is soft.      Tenderness: There is no " abdominal tenderness.   Musculoskeletal:      Right lower le+ Edema present.      Left lower leg: 3+ Edema present.   Skin:     General: Skin is warm.      Coloration: Skin is not jaundiced.   Neurological:      Mental Status: She is alert. Mental status is at baseline.      Gait: Gait normal.   Psychiatric:         Mood and Affect: Mood normal.         Behavior: Behavior normal.         Assessment & Plan:   1. Bilateral flank pain  Comments:  Improving   POC Urinalysis was unremarkable, send off for official UA/Urine Culture   Encouraged good PO hydration   Ordered US renal bilaterally, family history of kidney stones, no personal history known  Orders:  -     POCT URINE DIPSTICK WITHOUT MICROSCOPE  -     Urinalysis  -     Urine culture  -     US Doppler Abd/Retroperitoneal Limited; Future; Expected date: 2023    2. Chronic venous insufficiency of lower extremity  Assessment & Plan:  Worsening L>R   Had trauma (plantar fascitis on L foot in past)  Encouraged good PO hydration, limit sodium intake, avoid prolonged sitting/standing   Wear compression stockings daily   OK to use steroid/NSAIDS as needed for foot inflammation   If no improvement with conservative measures will send to vascular for further evaluation/management     3. History of recurrent UTI (urinary tract infection)  Assessment & Plan:  4 in the last year confirmed, after bladder surgery   Cipro, Doxycyline worked in the past   None recently, symptoms improving     Follow up if symptoms worsen or fail to improve. In addition to their scheduled follow up, the patient has also been instructed to follow up on as needed basis.

## 2023-08-08 NOTE — TELEPHONE ENCOUNTER
----- Message from [employer]ing [employer] sent at 8/8/2023  9:31 AM CDT -----  Regarding: US Doppler Abd/Retroperitoneal Limited  US Doppler Abd/Retroperitoneal Limited needs to be reordered with new diagnosis code.  Current code not cover by insurance. Thank [employer] [facility].

## 2023-08-09 NOTE — PROGRESS NOTES
Please let the patient know that her urine culture had no significant growth. Will not send in antibiotics at this time. Stay well hydrated.     Marely Abreu MD

## 2023-08-10 LAB — BACTERIA UR CULT: NORMAL

## 2023-11-02 ENCOUNTER — TELEPHONE (OUTPATIENT)
Dept: PRIMARY CARE CLINIC | Facility: CLINIC | Age: 73
End: 2023-11-02
Payer: COMMERCIAL

## 2023-11-02 NOTE — TELEPHONE ENCOUNTER
Are there any outstanding tasks inpatient's chart (ex. Labs)  \  Are there any outstanding/pending referrals?  YES REFERRAL   Has the patient been seen in the ED, UC, or has been admitted to the hospital since their last office visit?  Has the patient seen any other health care providers since their last office visit  Has the patient had any outside lab work including images since their last office visit?       Clinic Use    6. Is the patient's Pneumonia Vaccine Current?   Prevnar 13:     Prevnar 20:     Pneumovax 23:      7. When was the patients last   Colonoscopy:   Mammogram:   Cervical Screenin. When was the patients last diabetic screening?   A1c:    Micro:    Eye Exam:        Please remind pt to bring in medications in the bottles and to sign up for [facility]

## 2023-11-02 NOTE — TELEPHONE ENCOUNTER
Are there any outstanding tasks inpatient's chart (ex. Labs)  YES  Are there any outstanding/pending referrals?  No   Has the patient been seen in the ED, UC, or has been admitted to the hospital since their last office visit?  Has the patient seen any other health care providers since their last office visit  Has the patient had any outside lab work including images since their last office visit?       Clinic Use    6. Is the patient's Pneumonia Vaccine Current?   Prevnar 13:     Prevnar 20:     Pneumovax 23:      7. When was the patients last   Colonoscopy:   Mammogram:   Cervical Screenin. When was the patients last diabetic screening?   A1c:    Micro:    Eye Exam:         Micro:    Eye Exam:        Please remind pt to bring in medications in the bottles and to sign up for [facility]

## 2023-11-03 ENCOUNTER — LAB VISIT (OUTPATIENT)
Dept: LAB | Facility: HOSPITAL | Age: 73
End: 2023-11-03
Attending: STUDENT IN AN ORGANIZED HEALTH CARE EDUCATION/TRAINING PROGRAM
Payer: MEDICARE

## 2023-11-03 DIAGNOSIS — E55.9 VITAMIN D DEFICIENCY: ICD-10-CM

## 2023-11-03 DIAGNOSIS — R73.03 PREDIABETES: ICD-10-CM

## 2023-11-03 DIAGNOSIS — E78.5 HYPERLIPIDEMIA, UNSPECIFIED HYPERLIPIDEMIA TYPE: ICD-10-CM

## 2023-11-03 DIAGNOSIS — Z76.89 ENCOUNTER TO ESTABLISH CARE WITH NEW DOCTOR: ICD-10-CM

## 2023-11-03 DIAGNOSIS — E03.9 HYPOTHYROIDISM, UNSPECIFIED TYPE: ICD-10-CM

## 2023-11-03 DIAGNOSIS — Z00.00 WELLNESS EXAMINATION: ICD-10-CM

## 2023-11-03 LAB
ALBUMIN SERPL-MCNC: 3.6 G/DL (ref 3.4–4.8)
ALBUMIN/GLOB SERPL: 1.4 RATIO (ref 1.1–2)
ALP SERPL-CCNC: 66 UNIT/L (ref 40–150)
ALT SERPL-CCNC: 21 UNIT/L (ref 0–55)
APPEARANCE UR: CLEAR
AST SERPL-CCNC: 15 UNIT/L (ref 5–34)
BACTERIA #/AREA URNS AUTO: ABNORMAL /HPF
BASOPHILS # BLD AUTO: 0.04 X10(3)/MCL
BASOPHILS NFR BLD AUTO: 0.6 %
BILIRUB SERPL-MCNC: 0.6 MG/DL
BILIRUB UR QL STRIP.AUTO: NEGATIVE
BUN SERPL-MCNC: 17 MG/DL (ref 9.8–20.1)
CALCIUM SERPL-MCNC: 9.4 MG/DL (ref 8.4–10.2)
CHLORIDE SERPL-SCNC: 106 MMOL/L (ref 98–107)
CHOLEST SERPL-MCNC: 179 MG/DL
CHOLEST/HDLC SERPL: 3 {RATIO} (ref 0–5)
CO2 SERPL-SCNC: 25 MMOL/L (ref 23–31)
COLOR UR AUTO: ABNORMAL
CREAT SERPL-MCNC: 0.8 MG/DL (ref 0.55–1.02)
DEPRECATED CALCIDIOL+CALCIFEROL SERPL-MC: 85.2 NG/ML (ref 30–80)
EOSINOPHIL # BLD AUTO: 0.37 X10(3)/MCL (ref 0–0.9)
EOSINOPHIL NFR BLD AUTO: 6 %
ERYTHROCYTE [DISTWIDTH] IN BLOOD BY AUTOMATED COUNT: 14 % (ref 11.5–17)
EST. AVERAGE GLUCOSE BLD GHB EST-MCNC: 131.2 MG/DL
GFR SERPLBLD CREATININE-BSD FMLA CKD-EPI: >60 MLS/MIN/1.73/M2
GLOBULIN SER-MCNC: 2.5 GM/DL (ref 2.4–3.5)
GLUCOSE SERPL-MCNC: 99 MG/DL (ref 82–115)
GLUCOSE UR QL STRIP.AUTO: NORMAL
HBA1C MFR BLD: 6.2 %
HCT VFR BLD AUTO: 39.9 % (ref 37–47)
HDLC SERPL-MCNC: 54 MG/DL (ref 35–60)
HGB BLD-MCNC: 12.5 G/DL (ref 12–16)
IMM GRANULOCYTES # BLD AUTO: 0.02 X10(3)/MCL (ref 0–0.04)
IMM GRANULOCYTES NFR BLD AUTO: 0.3 %
KETONES UR QL STRIP.AUTO: NEGATIVE
LDLC SERPL CALC-MCNC: 91 MG/DL (ref 50–140)
LEUKOCYTE ESTERASE UR QL STRIP.AUTO: NEGATIVE
LYMPHOCYTES # BLD AUTO: 1.58 X10(3)/MCL (ref 0.6–4.6)
LYMPHOCYTES NFR BLD AUTO: 25.4 %
MCH RBC QN AUTO: 29.8 PG (ref 27–31)
MCHC RBC AUTO-ENTMCNC: 31.3 G/DL (ref 33–36)
MCV RBC AUTO: 95.2 FL (ref 80–94)
MONOCYTES # BLD AUTO: 0.6 X10(3)/MCL (ref 0.1–1.3)
MONOCYTES NFR BLD AUTO: 9.7 %
MUCOUS THREADS URNS QL MICRO: ABNORMAL /LPF
NEUTROPHILS # BLD AUTO: 3.6 X10(3)/MCL (ref 2.1–9.2)
NEUTROPHILS NFR BLD AUTO: 58 %
NITRITE UR QL STRIP.AUTO: NEGATIVE
NRBC BLD AUTO-RTO: 0 %
PH UR STRIP.AUTO: 6 [PH]
PLATELET # BLD AUTO: 229 X10(3)/MCL (ref 130–400)
PMV BLD AUTO: 9.9 FL (ref 7.4–10.4)
POTASSIUM SERPL-SCNC: 4 MMOL/L (ref 3.5–5.1)
PROT SERPL-MCNC: 6.1 GM/DL (ref 5.8–7.6)
PROT UR QL STRIP.AUTO: NEGATIVE
RBC # BLD AUTO: 4.19 X10(6)/MCL (ref 4.2–5.4)
RBC #/AREA URNS AUTO: ABNORMAL /HPF
RBC UR QL AUTO: NEGATIVE
SODIUM SERPL-SCNC: 139 MMOL/L (ref 136–145)
SP GR UR STRIP.AUTO: 1.01 (ref 1–1.03)
SQUAMOUS #/AREA URNS LPF: ABNORMAL /HPF
TRIGL SERPL-MCNC: 168 MG/DL (ref 37–140)
TSH SERPL-ACNC: 0.7 UIU/ML (ref 0.35–4.94)
UROBILINOGEN UR STRIP-ACNC: NORMAL
VLDLC SERPL CALC-MCNC: 34 MG/DL
WBC # SPEC AUTO: 6.21 X10(3)/MCL (ref 4.5–11.5)
WBC #/AREA URNS AUTO: ABNORMAL /HPF

## 2023-11-03 PROCEDURE — 81001 URINALYSIS AUTO W/SCOPE: CPT

## 2023-11-03 PROCEDURE — 36415 COLL VENOUS BLD VENIPUNCTURE: CPT

## 2023-11-03 PROCEDURE — 83036 HEMOGLOBIN GLYCOSYLATED A1C: CPT

## 2023-11-03 PROCEDURE — 86803 HEPATITIS C AB TEST: CPT

## 2023-11-03 PROCEDURE — 82306 VITAMIN D 25 HYDROXY: CPT

## 2023-11-03 PROCEDURE — 85025 COMPLETE CBC W/AUTO DIFF WBC: CPT

## 2023-11-03 PROCEDURE — 80061 LIPID PANEL: CPT

## 2023-11-03 PROCEDURE — 80053 COMPREHEN METABOLIC PANEL: CPT

## 2023-11-03 PROCEDURE — 84443 ASSAY THYROID STIM HORMONE: CPT

## 2023-11-04 LAB — HCV AB SERPL QL IA: NONREACTIVE

## 2023-11-08 ENCOUNTER — OFFICE VISIT (OUTPATIENT)
Dept: PRIMARY CARE CLINIC | Facility: CLINIC | Age: 73
End: 2023-11-08
Payer: MEDICARE

## 2023-11-08 VITALS
SYSTOLIC BLOOD PRESSURE: 134 MMHG | BODY MASS INDEX: 27.6 KG/M2 | WEIGHT: 150 LBS | TEMPERATURE: 98 F | OXYGEN SATURATION: 98 % | DIASTOLIC BLOOD PRESSURE: 74 MMHG | HEIGHT: 62 IN | RESPIRATION RATE: 20 BRPM | HEART RATE: 74 BPM

## 2023-11-08 DIAGNOSIS — E78.2 MIXED HYPERLIPIDEMIA: Chronic | ICD-10-CM

## 2023-11-08 DIAGNOSIS — Z00.00 WELLNESS EXAMINATION: Primary | ICD-10-CM

## 2023-11-08 DIAGNOSIS — E55.9 VITAMIN D DEFICIENCY: Chronic | ICD-10-CM

## 2023-11-08 DIAGNOSIS — R73.03 PREDIABETES: Chronic | ICD-10-CM

## 2023-11-08 DIAGNOSIS — D75.89 MACROCYTOSIS WITHOUT ANEMIA: ICD-10-CM

## 2023-11-08 DIAGNOSIS — R71.8 RBC ABNORMALITY: ICD-10-CM

## 2023-11-08 DIAGNOSIS — E03.9 HYPOTHYROIDISM, UNSPECIFIED TYPE: Chronic | ICD-10-CM

## 2023-11-08 DIAGNOSIS — Z12.31 ENCOUNTER FOR SCREENING MAMMOGRAM FOR BREAST CANCER: ICD-10-CM

## 2023-11-08 PROCEDURE — G0439 PR MEDICARE ANNUAL WELLNESS SUBSEQUENT VISIT: ICD-10-PCS | Mod: ,,, | Performed by: STUDENT IN AN ORGANIZED HEALTH CARE EDUCATION/TRAINING PROGRAM

## 2023-11-08 PROCEDURE — G0439 PPPS, SUBSEQ VISIT: HCPCS | Mod: ,,, | Performed by: STUDENT IN AN ORGANIZED HEALTH CARE EDUCATION/TRAINING PROGRAM

## 2023-11-08 RX ORDER — ICOSAPENT ETHYL 1000 MG/1
2 CAPSULE ORAL 2 TIMES DAILY
Qty: 360 CAPSULE | Refills: 3 | Status: SHIPPED | OUTPATIENT
Start: 2023-11-08 | End: 2024-03-15 | Stop reason: HOSPADM

## 2023-11-08 RX ORDER — HYDROGEN PEROXIDE 3 %
SOLUTION, NON-ORAL MISCELLANEOUS
COMMUNITY
Start: 2023-09-15

## 2023-11-08 RX ORDER — ROSUVASTATIN CALCIUM 10 MG/1
TABLET, COATED ORAL
COMMUNITY
End: 2024-03-15 | Stop reason: SINTOL

## 2023-11-26 PROBLEM — Z00.00 WELLNESS EXAMINATION: Chronic | Status: ACTIVE | Noted: 2022-08-02

## 2023-11-27 NOTE — ASSESSMENT & PLAN NOTE
Last Lipid Panel showed high triglycerides   Continue Crestor every other day, max tolerance   Start Vascepa for triglycerides   Counseled on dietary modifications  Counseled to exercise 150 minutes weekly as exercise raises HDL and lowers LDL

## 2023-11-27 NOTE — ASSESSMENT & PLAN NOTE
Stable, continue supplementation -  decrease in half   Repeat Level at Wellness Visit   Encouraged moderate sun exposure, increase PO calcium intake

## 2023-11-27 NOTE — ASSESSMENT & PLAN NOTE
Most recent A1c was 6.2  Continue Statin   Currently diet/lifestyle controlled, repeat for upcoming visit if remains high will consider medical therapy  Continue ADA diet, Counseled on importance of diet & weight loss

## 2023-11-27 NOTE — ASSESSMENT & PLAN NOTE
Routine Health Maintenance   Exercise as tolerated, >30 minutes a day for 3-5 days a week.  Counseled patient on compliance with medications and healthy diet.     Age-Appropriate Screening  Vaccinations:    - Flu: Postponed, patient's preference    - Pneumonia: Completed   - Shingles (>50): Postponed, patient's preference    - Tdap: UTD, 2019   - COVID: 2 dose series completed, boosters     Screening:   - Pap Smear (21-65): Aged out    - Mammogram (40-50 discuss w/ pt, all >50): Ordered today, due in December    - Osteoporosis (all>65, sooner if risk factors): UTD   - Lung Ca. Screening (50-80 if >20 pack yrs current or quit <15 yrs):N/A   - Colon Ca. Screening (age >45): UTD, 2019, every 10 years    - Hep. C: Negative

## 2023-12-28 ENCOUNTER — CLINICAL SUPPORT (OUTPATIENT)
Dept: PRIMARY CARE CLINIC | Facility: CLINIC | Age: 73
End: 2023-12-28
Payer: MEDICARE

## 2023-12-28 DIAGNOSIS — Z23 NEED FOR INFLUENZA VACCINATION: Primary | ICD-10-CM

## 2023-12-28 PROCEDURE — 90694 VACC AIIV4 NO PRSRV 0.5ML IM: CPT | Mod: ,,, | Performed by: STUDENT IN AN ORGANIZED HEALTH CARE EDUCATION/TRAINING PROGRAM

## 2023-12-28 PROCEDURE — G0008 ADMIN INFLUENZA VIRUS VAC: HCPCS | Mod: ,,, | Performed by: STUDENT IN AN ORGANIZED HEALTH CARE EDUCATION/TRAINING PROGRAM

## 2024-01-02 NOTE — PROGRESS NOTES
After obtaining consent, and per orders of Dr. lombardi, injection of flu given by Pradeep Her. Patient instructed to remain in clinic for 20 minutes afterwards, and to report any adverse reaction to me immediately.

## 2024-01-09 LAB — BCS RECOMMENDATION EXT: NORMAL

## 2024-01-17 ENCOUNTER — DOCUMENTATION ONLY (OUTPATIENT)
Dept: PRIMARY CARE CLINIC | Facility: CLINIC | Age: 74
End: 2024-01-17
Payer: COMMERCIAL

## 2024-01-17 ENCOUNTER — TELEPHONE (OUTPATIENT)
Dept: PRIMARY CARE CLINIC | Facility: CLINIC | Age: 74
End: 2024-01-17
Payer: COMMERCIAL

## 2024-02-26 PROBLEM — Z00.00 WELLNESS EXAMINATION: Chronic | Status: RESOLVED | Noted: 2022-08-02 | Resolved: 2024-02-26

## 2024-03-14 ENCOUNTER — TELEPHONE (OUTPATIENT)
Dept: PRIMARY CARE CLINIC | Facility: CLINIC | Age: 74
End: 2024-03-14
Payer: COMMERCIAL

## 2024-03-14 DIAGNOSIS — E78.2 MIXED HYPERLIPIDEMIA: Primary | Chronic | ICD-10-CM

## 2024-03-14 DIAGNOSIS — T46.6X5A MYALGIA DUE TO STATIN: ICD-10-CM

## 2024-03-14 DIAGNOSIS — M79.10 MYALGIA DUE TO STATIN: ICD-10-CM

## 2024-03-14 NOTE — TELEPHONE ENCOUNTER
----- Message from Eryn Cleary sent at 3/14/2024 10:24 AM CDT -----  Regarding: Patient Call  .Type:  Patient Returning Call    Who Called:pt  Who Left Message for Patient:office staff   Does the patient know what this is regarding?:medication clarity   Would the patient rather a call back or a response via [facility]?   Best Call Back Number: [phone]  Additional Information: pt is calling clarity of her rosuvastatin (CRESTOR) 10 MG tablet, please advise

## 2024-03-14 NOTE — TELEPHONE ENCOUNTER
Spoke to patent she reports she is not taking   Rosuvastatin 10mg   Having all the side affects as previously discussed with .   Patient reports to be open to alternative medication .

## 2024-03-15 ENCOUNTER — TELEPHONE (OUTPATIENT)
Dept: PRIMARY CARE CLINIC | Facility: CLINIC | Age: 74
End: 2024-03-15
Payer: COMMERCIAL

## 2024-03-15 DIAGNOSIS — E03.9 HYPOTHYROIDISM, UNSPECIFIED TYPE: ICD-10-CM

## 2024-03-15 PROBLEM — M79.10 MYALGIA DUE TO STATIN: Status: ACTIVE | Noted: 2024-03-15

## 2024-03-15 PROBLEM — T46.6X5A MYALGIA DUE TO STATIN: Status: ACTIVE | Noted: 2024-03-15

## 2024-03-15 RX ORDER — LORATADINE 10 MG/1
10 TABLET ORAL DAILY PRN
COMMUNITY
Start: 2023-12-15 | End: 2024-06-12

## 2024-03-15 RX ORDER — EZETIMIBE 10 MG/1
10 TABLET ORAL DAILY
Qty: 90 TABLET | Refills: 3 | Status: SHIPPED | OUTPATIENT
Start: 2024-03-15 | End: 2025-03-15

## 2024-03-15 NOTE — TELEPHONE ENCOUNTER
----- Message from Beryl Alexx sent at 3/15/2024 10:37 AM CDT -----  Regarding: medication  Type:  Needs Medical Advice    Who Called: Evelyn     Symptoms (please be specific):      How long has patient had these symptoms:      Pharmacy name and phone #:      Would the patient rather a call back or a response via [facility]? Call back     Best Call Back Number: [phone]    Additional Information: pt has questions about new medication

## 2024-03-15 NOTE — TELEPHONE ENCOUNTER
Agree with discontinuing Crestor if still having side effects. Continue Vascepa. Sent in Zetia instead for cholesterol.     Marely Abreu MD

## 2024-03-15 NOTE — TELEPHONE ENCOUNTER
"Patient called backed reports that she was not taking vascepa  "seems like I put the medication away sometimes in December"  Patient states she will start taking medication as she has found a full bottle at home   "

## 2024-04-03 ENCOUNTER — TELEPHONE (OUTPATIENT)
Dept: PRIMARY CARE CLINIC | Facility: CLINIC | Age: 74
End: 2024-04-03
Payer: COMMERCIAL

## 2024-04-03 DIAGNOSIS — E78.2 MIXED HYPERLIPIDEMIA: Primary | Chronic | ICD-10-CM

## 2024-04-03 NOTE — TELEPHONE ENCOUNTER
Spoke to patient   Reports that zetima is having same side effects. (Leg cramps muscle ache.)  Icosapent ethyl  1g twice daily is helping.   Is there another medication she can take.

## 2024-04-03 NOTE — TELEPHONE ENCOUNTER
----- Message from Judie Blanco sent at 4/3/2024  9:41 AM CDT -----  Regarding: call back  .Type:  Needs Medical Advice    Who Called: pt  Symptoms (please be specific):    How long has patient had these symptoms:    Pharmacy name and phone #:    Would the patient rather a call back or a response via [facility]? Call back   Best Call Back Number: [phone]  Additional Information: pt requesting a call back about med

## 2024-05-03 ENCOUNTER — LAB VISIT (OUTPATIENT)
Dept: LAB | Facility: HOSPITAL | Age: 74
End: 2024-05-03
Attending: STUDENT IN AN ORGANIZED HEALTH CARE EDUCATION/TRAINING PROGRAM
Payer: MEDICARE

## 2024-05-03 DIAGNOSIS — D75.89 MACROCYTOSIS WITHOUT ANEMIA: ICD-10-CM

## 2024-05-03 DIAGNOSIS — E78.2 MIXED HYPERLIPIDEMIA: Chronic | ICD-10-CM

## 2024-05-03 DIAGNOSIS — R71.8 RBC ABNORMALITY: ICD-10-CM

## 2024-05-03 LAB
CHOLEST SERPL-MCNC: 230 MG/DL
CHOLEST/HDLC SERPL: 4 {RATIO} (ref 0–5)
FERRITIN SERPL-MCNC: 34.75 NG/ML (ref 4.63–204)
FOLATE SERPL-MCNC: 11 NG/ML (ref 7–31.4)
HDLC SERPL-MCNC: 58 MG/DL (ref 35–60)
IRON SATN MFR SERPL: 40 % (ref 20–50)
IRON SERPL-MCNC: 124 UG/DL (ref 50–170)
LDLC SERPL CALC-MCNC: 139 MG/DL (ref 50–140)
TIBC SERPL-MCNC: 183 UG/DL (ref 70–310)
TIBC SERPL-MCNC: 307 UG/DL (ref 250–450)
TRANSFERRIN SERPL-MCNC: 280 MG/DL (ref 173–360)
TRIGL SERPL-MCNC: 167 MG/DL (ref 37–140)
VIT B12 SERPL-MCNC: 353 PG/ML (ref 213–816)
VLDLC SERPL CALC-MCNC: 33 MG/DL

## 2024-05-03 PROCEDURE — 83540 ASSAY OF IRON: CPT

## 2024-05-03 PROCEDURE — 82728 ASSAY OF FERRITIN: CPT

## 2024-05-03 PROCEDURE — 80061 LIPID PANEL: CPT

## 2024-05-03 PROCEDURE — 82607 VITAMIN B-12: CPT

## 2024-05-03 PROCEDURE — 82746 ASSAY OF FOLIC ACID SERUM: CPT

## 2024-05-03 PROCEDURE — 36415 COLL VENOUS BLD VENIPUNCTURE: CPT

## 2024-05-10 ENCOUNTER — LAB VISIT (OUTPATIENT)
Dept: LAB | Facility: HOSPITAL | Age: 74
End: 2024-05-10
Attending: STUDENT IN AN ORGANIZED HEALTH CARE EDUCATION/TRAINING PROGRAM
Payer: MEDICARE

## 2024-05-10 ENCOUNTER — OFFICE VISIT (OUTPATIENT)
Dept: PRIMARY CARE CLINIC | Facility: CLINIC | Age: 74
End: 2024-05-10
Payer: MEDICARE

## 2024-05-10 DIAGNOSIS — T46.6X5A MYALGIA DUE TO STATIN: ICD-10-CM

## 2024-05-10 DIAGNOSIS — R73.03 PREDIABETES: Primary | Chronic | ICD-10-CM

## 2024-05-10 DIAGNOSIS — E03.9 HYPOTHYROIDISM, UNSPECIFIED TYPE: Chronic | ICD-10-CM

## 2024-05-10 DIAGNOSIS — R53.83 FATIGUE, UNSPECIFIED TYPE: ICD-10-CM

## 2024-05-10 DIAGNOSIS — N39.41 URGE URINARY INCONTINENCE: Chronic | ICD-10-CM

## 2024-05-10 DIAGNOSIS — Z87.440 HISTORY OF RECURRENT UTI (URINARY TRACT INFECTION): ICD-10-CM

## 2024-05-10 DIAGNOSIS — E78.2 MIXED HYPERLIPIDEMIA: Chronic | ICD-10-CM

## 2024-05-10 DIAGNOSIS — I25.10 ARTERIOSCLEROSIS OF CORONARY ARTERY: Chronic | ICD-10-CM

## 2024-05-10 DIAGNOSIS — M79.10 MYALGIA DUE TO STATIN: ICD-10-CM

## 2024-05-10 LAB
BACTERIA #/AREA URNS AUTO: ABNORMAL /HPF
BILIRUB UR QL STRIP.AUTO: NEGATIVE
CLARITY UR: CLEAR
COLOR UR AUTO: ABNORMAL
GLUCOSE UR QL STRIP: NORMAL
HGB UR QL STRIP: NEGATIVE
KETONES UR QL STRIP: NEGATIVE
LEUKOCYTE ESTERASE UR QL STRIP: NEGATIVE
MUCOUS THREADS URNS QL MICRO: ABNORMAL /LPF
NITRITE UR QL STRIP: NEGATIVE
PH UR STRIP: 6 [PH]
PROT UR QL STRIP: NEGATIVE
RBC #/AREA URNS AUTO: ABNORMAL /HPF
SP GR UR STRIP.AUTO: 1.01 (ref 1–1.03)
SQUAMOUS #/AREA URNS LPF: ABNORMAL /HPF
TSH SERPL-ACNC: 0.98 UIU/ML (ref 0.35–4.94)
UROBILINOGEN UR STRIP-ACNC: NORMAL
WBC #/AREA URNS AUTO: ABNORMAL /HPF

## 2024-05-10 PROCEDURE — 99214 OFFICE O/P EST MOD 30 MIN: CPT | Mod: ,,, | Performed by: STUDENT IN AN ORGANIZED HEALTH CARE EDUCATION/TRAINING PROGRAM

## 2024-05-10 PROCEDURE — 36415 COLL VENOUS BLD VENIPUNCTURE: CPT

## 2024-05-10 PROCEDURE — 84443 ASSAY THYROID STIM HORMONE: CPT

## 2024-05-10 PROCEDURE — 81001 URINALYSIS AUTO W/SCOPE: CPT

## 2024-05-10 RX ORDER — METHYLPREDNISOLONE SOD SUCC 125 MG
125 VIAL (EA) INJECTION
OUTPATIENT
Start: 2024-06-01

## 2024-05-10 RX ORDER — DIPHENHYDRAMINE HYDROCHLORIDE 50 MG/ML
50 INJECTION INTRAMUSCULAR; INTRAVENOUS ONCE AS NEEDED
OUTPATIENT
Start: 2024-06-01

## 2024-05-10 RX ORDER — EPINEPHRINE 0.3 MG/.3ML
0.3 INJECTION SUBCUTANEOUS ONCE AS NEEDED
OUTPATIENT
Start: 2024-06-01

## 2024-05-10 RX ORDER — KETOROLAC TROMETHAMINE 30 MG/ML
15 INJECTION, SOLUTION INTRAMUSCULAR; INTRAVENOUS ONCE AS NEEDED
OUTPATIENT
Start: 2024-06-01

## 2024-05-10 RX ORDER — MIRABEGRON 25 MG/1
25 TABLET, FILM COATED, EXTENDED RELEASE ORAL DAILY
Qty: 30 TABLET | Refills: 11 | Status: SHIPPED | OUTPATIENT
Start: 2024-05-10 | End: 2025-05-10

## 2024-05-10 RX ORDER — METHYLPREDNISOLONE SOD SUCC 125 MG
125 VIAL (EA) INJECTION ONCE
OUTPATIENT
Start: 2024-06-01

## 2024-05-10 RX ORDER — ONDANSETRON HYDROCHLORIDE 2 MG/ML
8 INJECTION, SOLUTION INTRAVENOUS ONCE AS NEEDED
OUTPATIENT
Start: 2024-06-01

## 2024-05-10 RX ORDER — ACETAMINOPHEN 325 MG/1
650 TABLET ORAL
OUTPATIENT
Start: 2024-06-01

## 2024-05-10 NOTE — ASSESSMENT & PLAN NOTE
Most recent A1c was 6.4  Start Jardiance 10  Currently diet/lifestyle controlled, repeat for upcoming visit if remains high will consider medical therapy  Continue ADA diet, Counseled on importance of diet & weight loss

## 2024-05-10 NOTE — ASSESSMENT & PLAN NOTE
Stable   Unable to tolerate statins   LDL not at goal   Continue Zetia   Start infusion therapy  Continue to follow up with cardiologist   Currently asymptomatic   Discussed importance of control of LDL, A1c, and BP  Discussed lifestyle modifications including smoking cessation, exercise, weight loss, dietary modifications   ED precautions discussed for chest pain, SOB, syncope/near syncope, jaw pain, palpitations etc.

## 2024-05-10 NOTE — ASSESSMENT & PLAN NOTE
Stable, continue Levothyroxine   Last TSH WNL  Given fatigue we will check TSH  Encouraged strict medication compliance (take in the AM on an empty stomach with a full glass of water, no food/drink or other medications for >30 minutes)

## 2024-05-10 NOTE — PROGRESS NOTES
Subjective:       Patient ID: Evelyn Frances is a 74 y.o. female.    -------------------------------------    BPV (benign positional vertigo)    CAD (coronary artery disease)    Diverticulosis    Gastric reflux    Hypercholesterolemia    Hypothyroidism, unspecified    Osteopenia    Postinflammatory pulmonary fibrosis    Tinnitus, unspecified ear    Vitamin D deficiency      Chief Complaint: Diabetes and Medication Management    Presents to the clinic for follow-up.  Did endorse tiredness and overall fatigue.  Was diagnosed with ear infection and sinus infection at the end of March took the antibiotics that all cleared up but her tiredness remained.  Reviewed labs, answered all questions concerns at this time.  Lower end of normal vitamin B12, recommended over-the-counter vitamin B12 complex medication.  Also endorsed worsening urinary urgency, has a history of recurrent UTIs not sure if that could be contributing to her tiredness.  She also endorsed frequent nighttime awakenings to go urinate.  Had a bladder lift 2 years ago that did not really work she states that her bladder is prolapsing again.      Review of Systems   Constitutional:  Positive for fatigue. Negative for chills and fever.   Respiratory:  Negative for cough and shortness of breath.    Cardiovascular:  Negative for chest pain and leg swelling.   Gastrointestinal:  Negative for abdominal pain, diarrhea, nausea and vomiting.   Genitourinary:  Positive for bladder incontinence and urgency. Negative for dysuria and hematuria.         Objective:      Physical Exam  Vitals and nursing note reviewed.   Constitutional:       General: She is not in acute distress.     Appearance: Normal appearance. She is not ill-appearing.   HENT:      Head: Normocephalic.      Nose: Nose normal.      Mouth/Throat:      Mouth: Mucous membranes are moist.   Eyes:      General: No scleral icterus.     Conjunctiva/sclera: Conjunctivae normal.   Cardiovascular:      Rate  and Rhythm: Normal rate and regular rhythm.   Pulmonary:      Effort: Pulmonary effort is normal. No respiratory distress.   Musculoskeletal:         General: No deformity.   Skin:     General: Skin is warm and dry.      Coloration: Skin is not jaundiced.   Neurological:      Mental Status: She is alert and oriented to person, place, and time. Mental status is at baseline.      Cranial Nerves: No cranial nerve deficit.      Gait: Gait normal.   Psychiatric:         Mood and Affect: Mood normal.         Behavior: Behavior normal.         Assessment & Plan:   1. Prediabetes  Overview:  Metformin - GI side effects, flu like symptoms    Assessment & Plan:  Most recent A1c was 6.4  Start Jardiance 10  Currently diet/lifestyle controlled, repeat for upcoming visit if remains high will consider medical therapy  Continue ADA diet, Counseled on importance of diet & weight loss       Orders:  -     empagliflozin (JARDIANCE) 10 mg tablet; Take 1 tablet (10 mg total) by mouth once daily.  Dispense: 30 tablet; Refill: 6  -     Basic Metabolic Panel; Future; Expected date: 08/10/2024    2. Fatigue, unspecified type  -     TSH; Future; Expected date: 05/10/2024  -     Urinalysis, Reflex to Urine Culture; Future; Expected date: 05/10/2024    3. Mixed hyperlipidemia  Assessment & Plan:  Last Lipid Panel showed high triglycerides , high total cholesterol, high LDL (goal less than 70)   Unable to tolerate statin therapy due to side effects -myalgias, joint pain  Continue Zetia   Given CAD, atherosclerosis of the coronary arteries, will start infusion therapy for better cholesterol control  Counseled on dietary modifications  Counseled to exercise 150 minutes weekly as exercise raises HDL and lowers LDL       Orders:  -     Lipid Panel; Future; Expected date: 08/10/2024    4. Arteriosclerosis of coronary artery  Overview:  5/2016 Cardiac workup with Dr Weber - CT Calcium score (8), normal Echo, normal carotid ICA US bilaterallly, EST  negative, no significant venous insufficiency in legs?  Imaging showed atherosclerosis    Assessment & Plan:  Stable   Unable to tolerate statins   LDL not at goal   Continue Zetia   Start infusion therapy  Continue to follow up with cardiologist   Currently asymptomatic   Discussed importance of control of LDL, A1c, and BP  Discussed lifestyle modifications including smoking cessation, exercise, weight loss, dietary modifications   ED precautions discussed for chest pain, SOB, syncope/near syncope, jaw pain, palpitations etc.       Orders:  -     Lipid Panel; Future; Expected date: 08/10/2024    5. Myalgia due to statin  Assessment & Plan:  Unable to tolerate statin therapy   On Zetia   LDL not at goal   Cardiovascular disease, will start infusion therapy    Orders:  -     Lipid Panel; Future; Expected date: 08/10/2024    6. History of recurrent UTI (urinary tract infection)  Overview:  Cipro, Doxycyline worked in the past       7. Hypothyroidism, unspecified type  Assessment & Plan:  Stable, continue Levothyroxine   Last TSH WNL  Given fatigue we will check TSH  Encouraged strict medication compliance (take in the AM on an empty stomach with a full glass of water, no food/drink or other medications for >30 minutes)       Orders:  -     TSH; Future; Expected date: 05/10/2024    8. Urge urinary incontinence  Assessment & Plan:  Worsening  Obtain urinalysis reflux culture to rule out infection   Start medication to decrease nocturia    Orders:  -     mirabegron (MYRBETRIQ) 25 mg Tb24 ER tablet; Take 1 tablet (25 mg total) by mouth once daily.  Dispense: 30 tablet; Refill: 11  -     TSH; Future; Expected date: 05/10/2024  -     Urinalysis, Reflex to Urine Culture; Future; Expected date: 05/10/2024    Other orders  -     inclisiran 284 mg/1.5 mL subcutaneous injection 284 mg  -     inclisiran 284 mg/1.5 mL subcutaneous injection 284 mg  -     acetaminophen tablet 650 mg  -     diphenhydrAMINE (BENADRYL) 50 mg in sodium  chloride 0.9% 50 mL IVPB  -     methylPREDNISolone sodium succinate injection 125 mg  -     ondansetron injection 8 mg  -     ketorolac injection 15 mg  -     EPINEPHrine (EPIPEN) 0.3 mg/0.3 mL pen injection 0.3 mg  -     diphenhydrAMINE injection 50 mg  -     methylPREDNISolone sodium succinate injection 125 mg      Follow up in about 3 months (around 8/10/2024) for Diabetes, HLD, Urinary issues . In addition to their scheduled follow up, the patient has also been instructed to follow up on as needed basis.

## 2024-05-10 NOTE — ASSESSMENT & PLAN NOTE
Worsening  Obtain urinalysis reflux culture to rule out infection   Start medication to decrease nocturia

## 2024-05-10 NOTE — ASSESSMENT & PLAN NOTE
Unable to tolerate statin therapy   On Zetia   LDL not at goal   Cardiovascular disease, will start infusion therapy

## 2024-05-10 NOTE — ASSESSMENT & PLAN NOTE
Last Lipid Panel showed high triglycerides , high total cholesterol, high LDL (goal less than 70)   Unable to tolerate statin therapy due to side effects -myalgias, joint pain  Continue Zetia   Given CAD, atherosclerosis of the coronary arteries, will start infusion therapy for better cholesterol control  Counseled on dietary modifications  Counseled to exercise 150 minutes weekly as exercise raises HDL and lowers LDL

## 2024-05-20 ENCOUNTER — TELEPHONE (OUTPATIENT)
Dept: PRIMARY CARE CLINIC | Facility: CLINIC | Age: 74
End: 2024-05-20
Payer: COMMERCIAL

## 2024-05-21 ENCOUNTER — TELEPHONE (OUTPATIENT)
Dept: PRIMARY CARE CLINIC | Facility: CLINIC | Age: 74
End: 2024-05-21
Payer: COMMERCIAL

## 2024-05-21 NOTE — TELEPHONE ENCOUNTER
----- Message from Ani Higgins sent at 5/21/2024  4:22 PM CDT -----  .Who Called: Evelyn Frances          Patient's Preferred Phone Number on File: [phone]   Best Call Back Number, if different:  Additional Information: Pradeep burk returning your call

## 2024-06-12 ENCOUNTER — PATIENT MESSAGE (OUTPATIENT)
Dept: PRIMARY CARE CLINIC | Facility: CLINIC | Age: 74
End: 2024-06-12
Payer: COMMERCIAL

## 2024-07-15 ENCOUNTER — INFUSION (OUTPATIENT)
Dept: INFUSION THERAPY | Facility: HOSPITAL | Age: 74
End: 2024-07-15
Attending: STUDENT IN AN ORGANIZED HEALTH CARE EDUCATION/TRAINING PROGRAM
Payer: MEDICARE

## 2024-07-15 VITALS
OXYGEN SATURATION: 96 % | SYSTOLIC BLOOD PRESSURE: 131 MMHG | TEMPERATURE: 98 F | HEART RATE: 68 BPM | DIASTOLIC BLOOD PRESSURE: 64 MMHG

## 2024-07-15 DIAGNOSIS — E78.2 MIXED HYPERLIPIDEMIA: ICD-10-CM

## 2024-07-15 DIAGNOSIS — M79.10 MYALGIA DUE TO STATIN: ICD-10-CM

## 2024-07-15 DIAGNOSIS — T46.6X5A MYALGIA DUE TO STATIN: ICD-10-CM

## 2024-07-15 DIAGNOSIS — I25.10 ARTERIOSCLEROSIS OF CORONARY ARTERY: Primary | ICD-10-CM

## 2024-07-15 PROCEDURE — 63600175 PHARM REV CODE 636 W HCPCS: Mod: JZ,TB | Performed by: STUDENT IN AN ORGANIZED HEALTH CARE EDUCATION/TRAINING PROGRAM

## 2024-07-15 PROCEDURE — 96372 THER/PROPH/DIAG INJ SC/IM: CPT

## 2024-07-15 PROCEDURE — 25000003 PHARM REV CODE 250: Performed by: STUDENT IN AN ORGANIZED HEALTH CARE EDUCATION/TRAINING PROGRAM

## 2024-07-15 RX ORDER — ONDANSETRON HYDROCHLORIDE 2 MG/ML
8 INJECTION, SOLUTION INTRAVENOUS ONCE AS NEEDED
Status: DISCONTINUED | OUTPATIENT
Start: 2024-07-15 | End: 2024-07-15 | Stop reason: HOSPADM

## 2024-07-15 RX ORDER — ACETAMINOPHEN 325 MG/1
650 TABLET ORAL
OUTPATIENT
Start: 2024-08-01

## 2024-07-15 RX ORDER — ACETAMINOPHEN 325 MG/1
650 TABLET ORAL
Status: COMPLETED | OUTPATIENT
Start: 2024-07-15 | End: 2024-07-15

## 2024-07-15 RX ORDER — METHYLPREDNISOLONE SOD SUCC 125 MG
125 VIAL (EA) INJECTION ONCE
OUTPATIENT
Start: 2024-08-01

## 2024-07-15 RX ORDER — METHYLPREDNISOLONE SOD SUCC 125 MG
125 VIAL (EA) INJECTION
OUTPATIENT
Start: 2024-08-01

## 2024-07-15 RX ORDER — DIPHENHYDRAMINE HYDROCHLORIDE 50 MG/ML
50 INJECTION INTRAMUSCULAR; INTRAVENOUS ONCE AS NEEDED
OUTPATIENT
Start: 2024-08-01

## 2024-07-15 RX ORDER — KETOROLAC TROMETHAMINE 30 MG/ML
15 INJECTION, SOLUTION INTRAMUSCULAR; INTRAVENOUS ONCE AS NEEDED
Status: DISCONTINUED | OUTPATIENT
Start: 2024-07-15 | End: 2024-07-15 | Stop reason: HOSPADM

## 2024-07-15 RX ORDER — METHYLPREDNISOLONE SOD SUCC 125 MG
125 VIAL (EA) INJECTION ONCE AS NEEDED
Status: DISCONTINUED | OUTPATIENT
Start: 2024-07-15 | End: 2024-07-15 | Stop reason: HOSPADM

## 2024-07-15 RX ORDER — ONDANSETRON HYDROCHLORIDE 2 MG/ML
8 INJECTION, SOLUTION INTRAVENOUS ONCE AS NEEDED
OUTPATIENT
Start: 2024-08-01

## 2024-07-15 RX ORDER — DIPHENHYDRAMINE HYDROCHLORIDE 50 MG/ML
50 INJECTION INTRAMUSCULAR; INTRAVENOUS ONCE AS NEEDED
Status: DISCONTINUED | OUTPATIENT
Start: 2024-07-15 | End: 2024-07-15 | Stop reason: HOSPADM

## 2024-07-15 RX ORDER — KETOROLAC TROMETHAMINE 30 MG/ML
15 INJECTION, SOLUTION INTRAMUSCULAR; INTRAVENOUS ONCE AS NEEDED
OUTPATIENT
Start: 2024-08-01

## 2024-07-15 RX ORDER — EPINEPHRINE 0.3 MG/.3ML
0.3 INJECTION SUBCUTANEOUS ONCE AS NEEDED
OUTPATIENT
Start: 2024-08-01

## 2024-07-15 RX ORDER — EPINEPHRINE 0.3 MG/.3ML
0.3 INJECTION SUBCUTANEOUS ONCE AS NEEDED
Status: DISCONTINUED | OUTPATIENT
Start: 2024-07-15 | End: 2024-07-15 | Stop reason: HOSPADM

## 2024-07-15 RX ADMIN — INCLISIRAN 284 MG: 284 INJECTION, SOLUTION SUBCUTANEOUS at 02:07

## 2024-07-15 RX ADMIN — ACETAMINOPHEN 650 MG: 325 TABLET ORAL at 02:07

## 2024-08-06 ENCOUNTER — TELEPHONE (OUTPATIENT)
Dept: PRIMARY CARE CLINIC | Facility: CLINIC | Age: 74
End: 2024-08-06
Payer: MEDICARE

## 2024-08-12 ENCOUNTER — LAB VISIT (OUTPATIENT)
Dept: LAB | Facility: HOSPITAL | Age: 74
End: 2024-08-12
Attending: STUDENT IN AN ORGANIZED HEALTH CARE EDUCATION/TRAINING PROGRAM
Payer: MEDICARE

## 2024-08-12 DIAGNOSIS — M79.10 MYALGIA DUE TO STATIN: ICD-10-CM

## 2024-08-12 DIAGNOSIS — T46.6X5A MYALGIA DUE TO STATIN: ICD-10-CM

## 2024-08-12 DIAGNOSIS — R73.03 PREDIABETES: Chronic | ICD-10-CM

## 2024-08-12 DIAGNOSIS — E78.2 MIXED HYPERLIPIDEMIA: Chronic | ICD-10-CM

## 2024-08-12 DIAGNOSIS — I25.10 ARTERIOSCLEROSIS OF CORONARY ARTERY: Chronic | ICD-10-CM

## 2024-08-12 LAB
ANION GAP SERPL CALC-SCNC: 8 MEQ/L
BUN SERPL-MCNC: 14.4 MG/DL (ref 9.8–20.1)
CALCIUM SERPL-MCNC: 10.2 MG/DL (ref 8.4–10.2)
CHLORIDE SERPL-SCNC: 104 MMOL/L (ref 98–107)
CHOLEST SERPL-MCNC: 156 MG/DL
CHOLEST/HDLC SERPL: 2 {RATIO} (ref 0–5)
CO2 SERPL-SCNC: 26 MMOL/L (ref 23–31)
CREAT SERPL-MCNC: 1.01 MG/DL (ref 0.55–1.02)
CREAT/UREA NIT SERPL: 14
GFR SERPLBLD CREATININE-BSD FMLA CKD-EPI: 59 ML/MIN/1.73/M2
GLUCOSE SERPL-MCNC: 92 MG/DL (ref 82–115)
HDLC SERPL-MCNC: 63 MG/DL (ref 35–60)
LDLC SERPL CALC-MCNC: 55 MG/DL (ref 50–140)
POTASSIUM SERPL-SCNC: 4.1 MMOL/L (ref 3.5–5.1)
SODIUM SERPL-SCNC: 138 MMOL/L (ref 136–145)
TRIGL SERPL-MCNC: 190 MG/DL (ref 37–140)
VLDLC SERPL CALC-MCNC: 38 MG/DL

## 2024-08-12 PROCEDURE — 80048 BASIC METABOLIC PNL TOTAL CA: CPT

## 2024-08-12 PROCEDURE — 36415 COLL VENOUS BLD VENIPUNCTURE: CPT

## 2024-08-12 PROCEDURE — 80061 LIPID PANEL: CPT

## 2024-08-14 ENCOUNTER — OFFICE VISIT (OUTPATIENT)
Dept: PRIMARY CARE CLINIC | Facility: CLINIC | Age: 74
End: 2024-08-14
Payer: MEDICARE

## 2024-08-14 VITALS
BODY MASS INDEX: 25.88 KG/M2 | TEMPERATURE: 98 F | SYSTOLIC BLOOD PRESSURE: 112 MMHG | HEIGHT: 62 IN | WEIGHT: 140.63 LBS | HEART RATE: 67 BPM | RESPIRATION RATE: 18 BRPM | OXYGEN SATURATION: 99 % | DIASTOLIC BLOOD PRESSURE: 70 MMHG

## 2024-08-14 DIAGNOSIS — E03.9 HYPOTHYROIDISM, UNSPECIFIED TYPE: Chronic | ICD-10-CM

## 2024-08-14 DIAGNOSIS — E78.1 HYPERTRIGLYCERIDEMIA: Chronic | ICD-10-CM

## 2024-08-14 DIAGNOSIS — Z78.0 POST-MENOPAUSAL: ICD-10-CM

## 2024-08-14 DIAGNOSIS — N39.41 URGE URINARY INCONTINENCE: Chronic | ICD-10-CM

## 2024-08-14 DIAGNOSIS — E55.9 VITAMIN D DEFICIENCY: Chronic | ICD-10-CM

## 2024-08-14 DIAGNOSIS — R73.03 PREDIABETES: Chronic | ICD-10-CM

## 2024-08-14 DIAGNOSIS — Z00.00 WELLNESS EXAMINATION: ICD-10-CM

## 2024-08-14 DIAGNOSIS — E78.2 MIXED HYPERLIPIDEMIA: Primary | Chronic | ICD-10-CM

## 2024-08-14 PROCEDURE — 99214 OFFICE O/P EST MOD 30 MIN: CPT | Mod: ,,, | Performed by: STUDENT IN AN ORGANIZED HEALTH CARE EDUCATION/TRAINING PROGRAM

## 2024-08-14 RX ORDER — OMEGA-3-ACID ETHYL ESTERS 1 G/1
2 CAPSULE, LIQUID FILLED ORAL 2 TIMES DAILY
Qty: 360 CAPSULE | Refills: 3 | Status: SHIPPED | OUTPATIENT
Start: 2024-08-14 | End: 2025-08-14

## 2024-08-14 RX ORDER — ESTRADIOL 1 MG/1
TABLET ORAL
COMMUNITY
Start: 1994-05-19

## 2024-08-14 NOTE — ASSESSMENT & PLAN NOTE
Last lipid panel showed elevated triglycerides   Start Omega 3 supplementation 2g BID   Repeat for wellness visit   Encouraged dietary/lifestyle modifications

## 2024-08-14 NOTE — ASSESSMENT & PLAN NOTE
Stable, continue supplementation -  decreased in half   Repeat Level at Wellness Visit   Encouraged moderate sun exposure, increase PO calcium intake

## 2024-08-14 NOTE — ASSESSMENT & PLAN NOTE
Last Lipid Panel showed high triglycerides , normal total cholesterol and LDL (goal less than 70)   Unable to tolerate statin therapy due to side effects -myalgias, joint pain  Unable to tolerate Zetia - muscle aches  Given CAD, atherosclerosis of the coronary arteries, continue infusion therapy for better cholesterol control - tolerating well  Counseled on dietary modifications  Counseled to exercise 150 minutes weekly as exercise raises HDL and lowers LDL

## 2024-08-14 NOTE — ASSESSMENT & PLAN NOTE
Most recent A1c was 6.1 improved since last time was 6.4  Continue Jardiance 10 mg daily  Currently diet/lifestyle controlled, repeat for upcoming visit if remains high will consider medical therapy  Continue ADA diet, Counseled on importance of diet & weight loss

## 2024-08-14 NOTE — ASSESSMENT & PLAN NOTE
Stable, continue Levothyroxine   Last TSH WNL  Repeat at wellness visit   Encouraged strict medication compliance (take in the AM on an empty stomach with a full glass of water, no food/drink or other medications for >30 minutes)

## 2024-08-14 NOTE — PROGRESS NOTES
Subjective:       Patient ID: Evelyn Frances is a 74 y.o. female.    -------------------------------------    BPV (benign positional vertigo)    CAD (coronary artery disease)    Diverticulosis    Gastric reflux    Hypercholesterolemia    Hypothyroidism, unspecified    Osteopenia    Postinflammatory pulmonary fibrosis    Tinnitus, unspecified ear    Vitamin D deficiency      Chief Complaint: Results    Presents to the clinic for follow up. Overall doing well. No acute complaints. Due for DEXA Scan. Had one infusion of Leqvio thus far. Reviewed labs answered all questions and concerns at this time. Triglycerides were elevated, improvement in total and LDL. POC A1c was 6.1       Review of Systems   Constitutional:  Negative for chills and fever.   Respiratory:  Negative for cough and shortness of breath.    Cardiovascular:  Negative for chest pain and leg swelling.   Gastrointestinal:  Negative for abdominal pain, diarrhea, nausea and vomiting.   Genitourinary:  Negative for dysuria and hematuria.   Psychiatric/Behavioral:  Negative for dysphoric mood and sleep disturbance. The patient is not nervous/anxious.          Objective:      Physical Exam  Vitals and nursing note reviewed.   Constitutional:       General: She is not in acute distress.     Appearance: Normal appearance. She is not ill-appearing.   HENT:      Head: Normocephalic.      Nose: Nose normal.      Mouth/Throat:      Mouth: Mucous membranes are moist.   Eyes:      General: No scleral icterus.     Conjunctiva/sclera: Conjunctivae normal.   Cardiovascular:      Rate and Rhythm: Normal rate and regular rhythm.   Pulmonary:      Effort: Pulmonary effort is normal. No respiratory distress.   Musculoskeletal:         General: No deformity.   Skin:     General: Skin is warm and dry.      Coloration: Skin is not jaundiced.   Neurological:      Mental Status: She is alert and oriented to person, place, and time. Mental status is at baseline.      Cranial  Nerves: No cranial nerve deficit.      Gait: Gait normal.   Psychiatric:         Mood and Affect: Mood normal.         Behavior: Behavior normal.         Assessment & Plan:   1. Mixed hyperlipidemia  Assessment & Plan:  Last Lipid Panel showed high triglycerides , normal total cholesterol and LDL (goal less than 70)   Unable to tolerate statin therapy due to side effects -myalgias, joint pain  Unable to tolerate Zetia - muscle aches  Given CAD, atherosclerosis of the coronary arteries, continue infusion therapy for better cholesterol control - tolerating well  Counseled on dietary modifications  Counseled to exercise 150 minutes weekly as exercise raises HDL and lowers LDL       Orders:  -     CBC Auto Differential; Future; Expected date: 11/14/2024  -     Comprehensive Metabolic Panel; Future; Expected date: 11/14/2024  -     Urinalysis; Future; Expected date: 11/14/2024  -     TSH; Future; Expected date: 11/14/2024  -     Lipid Panel; Future; Expected date: 11/14/2024  -     Hemoglobin A1C; Future; Expected date: 11/14/2024  -     Vitamin D; Future; Expected date: 11/14/2024    2. Hypertriglyceridemia  Assessment & Plan:  Last lipid panel showed elevated triglycerides   Start Omega 3 supplementation 2g BID   Repeat for wellness visit   Encouraged dietary/lifestyle modifications     Orders:  -     omega-3 acid ethyl esters (LOVAZA) 1 gram capsule; Take 2 capsules (2 g total) by mouth 2 (two) times daily.  Dispense: 360 capsule; Refill: 3  -     CBC Auto Differential; Future; Expected date: 11/14/2024  -     Comprehensive Metabolic Panel; Future; Expected date: 11/14/2024  -     Urinalysis; Future; Expected date: 11/14/2024  -     TSH; Future; Expected date: 11/14/2024  -     Lipid Panel; Future; Expected date: 11/14/2024  -     Hemoglobin A1C; Future; Expected date: 11/14/2024  -     Vitamin D; Future; Expected date: 11/14/2024    3. Post-menopausal  -     DXA Bone Density Axial Skeleton 1 or more sites; Future;  Expected date: 08/14/2024    4. Prediabetes  Overview:  Metformin - GI side effects, flu like symptoms    Assessment & Plan:  Most recent A1c was 6.1 improved since last time was 6.4  Continue Jardiance 10 mg daily  Currently diet/lifestyle controlled, repeat for upcoming visit if remains high will consider medical therapy  Continue ADA diet, Counseled on importance of diet & weight loss       Orders:  -     CBC Auto Differential; Future; Expected date: 11/14/2024  -     Comprehensive Metabolic Panel; Future; Expected date: 11/14/2024  -     Urinalysis; Future; Expected date: 11/14/2024  -     TSH; Future; Expected date: 11/14/2024  -     Lipid Panel; Future; Expected date: 11/14/2024  -     Hemoglobin A1C; Future; Expected date: 11/14/2024  -     Vitamin D; Future; Expected date: 11/14/2024    5. Urge urinary incontinence  Overview:  Myrbetriq - dry mouth, dehydrated, blurry vision    Assessment & Plan:  Stable improved   Continue to work on behavioral modification  Timed bladder emptying/training   Avoid irritants - caffeine, soda, alcohol, coffee     Orders:  -     CBC Auto Differential; Future; Expected date: 11/14/2024  -     Comprehensive Metabolic Panel; Future; Expected date: 11/14/2024  -     Urinalysis; Future; Expected date: 11/14/2024  -     TSH; Future; Expected date: 11/14/2024  -     Lipid Panel; Future; Expected date: 11/14/2024  -     Hemoglobin A1C; Future; Expected date: 11/14/2024  -     Vitamin D; Future; Expected date: 11/14/2024    6. Vitamin D deficiency  Assessment & Plan:  Stable, continue supplementation -  decreased in half   Repeat Level at Wellness Visit   Encouraged moderate sun exposure, increase PO calcium intake      Orders:  -     CBC Auto Differential; Future; Expected date: 11/14/2024  -     Comprehensive Metabolic Panel; Future; Expected date: 11/14/2024  -     Urinalysis; Future; Expected date: 11/14/2024  -     TSH; Future; Expected date: 11/14/2024  -     Lipid Panel; Future;  Expected date: 11/14/2024  -     Hemoglobin A1C; Future; Expected date: 11/14/2024  -     Vitamin D; Future; Expected date: 11/14/2024    7. Hypothyroidism, unspecified type  Assessment & Plan:  Stable, continue Levothyroxine   Last TSH WNL  Repeat at wellness visit   Encouraged strict medication compliance (take in the AM on an empty stomach with a full glass of water, no food/drink or other medications for >30 minutes)       Orders:  -     CBC Auto Differential; Future; Expected date: 11/14/2024  -     Comprehensive Metabolic Panel; Future; Expected date: 11/14/2024  -     Urinalysis; Future; Expected date: 11/14/2024  -     TSH; Future; Expected date: 11/14/2024  -     Lipid Panel; Future; Expected date: 11/14/2024  -     Hemoglobin A1C; Future; Expected date: 11/14/2024  -     Vitamin D; Future; Expected date: 11/14/2024    8. Wellness examination  -     CBC Auto Differential; Future; Expected date: 11/14/2024  -     Comprehensive Metabolic Panel; Future; Expected date: 11/14/2024  -     Urinalysis; Future; Expected date: 11/14/2024  -     TSH; Future; Expected date: 11/14/2024  -     Lipid Panel; Future; Expected date: 11/14/2024  -     Hemoglobin A1C; Future; Expected date: 11/14/2024  -     Vitamin D; Future; Expected date: 11/14/2024      Follow up in about 3 months (around 11/14/2024) for Wellness. In addition to their scheduled follow up, the patient has also been instructed to follow up on as needed basis.

## 2024-08-14 NOTE — ASSESSMENT & PLAN NOTE
Stable improved   Continue to work on behavioral modification  Timed bladder emptying/training   Avoid irritants - caffeine, soda, alcohol, coffee

## 2024-08-16 ENCOUNTER — TELEPHONE (OUTPATIENT)
Dept: PRIMARY CARE CLINIC | Facility: CLINIC | Age: 74
End: 2024-08-16
Payer: MEDICARE

## 2024-08-16 DIAGNOSIS — E03.9 HYPOTHYROIDISM, UNSPECIFIED TYPE: ICD-10-CM

## 2024-08-16 RX ORDER — LEVOTHYROXINE SODIUM 100 UG/1
100 TABLET ORAL
Qty: 90 TABLET | Refills: 3 | Status: SHIPPED | OUTPATIENT
Start: 2024-08-16

## 2024-08-16 NOTE — TELEPHONE ENCOUNTER
----- Message from Delia Reis sent at 8/16/2024  8:17 AM CDT -----  .Type:  RX Refill Request    Who Called: pt  Refill or New Rx:refill  RX Name and Strength:levothyroxine (SYNTHROID)  How is the patient currently taking it? (ex. 1XDay):1/day  Is this a 30 day or 90 day RX:30  Preferred Pharmacy with phone number:[facility]  Local or Mail Order:Local  Ordering Provider:Lois  Would the patient rather a call back or a response via [facility]?   Best Call Back Number:[phone]  Additional Information: levothyroxine (SYNTHROID)

## 2024-09-09 ENCOUNTER — TELEPHONE (OUTPATIENT)
Dept: PRIMARY CARE CLINIC | Facility: CLINIC | Age: 74
End: 2024-09-09
Payer: MEDICARE

## 2024-09-09 NOTE — TELEPHONE ENCOUNTER
----- Message from Kai Robles sent at 9/9/2024  8:27 AM CDT -----  Who Called: Evelyn Frances    Caller is requesting a same day appointment. Caller declined first available appointment listed below.      When is the first available appointment?10/09  Options offered (Virtual Visit, Urgent Care):   Symptoms or reason for appointment: Rash on arm      Preferred Method of Contact: Phone Call  Patient's Preferred Phone Number on File: [phone]   Best Call Back Number, if different:  Additional Information: Pt states she would like to come in due to a rash , thinks rash may be infected.

## 2024-10-17 ENCOUNTER — INFUSION (OUTPATIENT)
Dept: INFUSION THERAPY | Facility: HOSPITAL | Age: 74
End: 2024-10-17
Attending: STUDENT IN AN ORGANIZED HEALTH CARE EDUCATION/TRAINING PROGRAM
Payer: MEDICARE

## 2024-10-17 VITALS — DIASTOLIC BLOOD PRESSURE: 73 MMHG | SYSTOLIC BLOOD PRESSURE: 157 MMHG | TEMPERATURE: 98 F | HEART RATE: 69 BPM

## 2024-10-17 DIAGNOSIS — T46.6X5A MYALGIA DUE TO STATIN: ICD-10-CM

## 2024-10-17 DIAGNOSIS — E78.2 MIXED HYPERLIPIDEMIA: ICD-10-CM

## 2024-10-17 DIAGNOSIS — M79.10 MYALGIA DUE TO STATIN: ICD-10-CM

## 2024-10-17 DIAGNOSIS — I25.10 ARTERIOSCLEROSIS OF CORONARY ARTERY: Primary | ICD-10-CM

## 2024-10-17 PROCEDURE — 96372 THER/PROPH/DIAG INJ SC/IM: CPT

## 2024-10-17 PROCEDURE — 63600175 PHARM REV CODE 636 W HCPCS: Mod: JZ,TB | Performed by: STUDENT IN AN ORGANIZED HEALTH CARE EDUCATION/TRAINING PROGRAM

## 2024-10-17 RX ORDER — ACETAMINOPHEN 325 MG/1
650 TABLET ORAL
OUTPATIENT
Start: 2025-01-01

## 2024-10-17 RX ORDER — EPINEPHRINE 0.3 MG/.3ML
0.3 INJECTION SUBCUTANEOUS ONCE AS NEEDED
OUTPATIENT
Start: 2025-01-01

## 2024-10-17 RX ORDER — ONDANSETRON HYDROCHLORIDE 2 MG/ML
8 INJECTION, SOLUTION INTRAVENOUS ONCE AS NEEDED
OUTPATIENT
Start: 2025-01-01

## 2024-10-17 RX ORDER — METHYLPREDNISOLONE SOD SUCC 125 MG
125 VIAL (EA) INJECTION
OUTPATIENT
Start: 2025-01-01

## 2024-10-17 RX ORDER — METHYLPREDNISOLONE SOD SUCC 125 MG
125 VIAL (EA) INJECTION ONCE
OUTPATIENT
Start: 2025-01-01

## 2024-10-17 RX ORDER — KETOROLAC TROMETHAMINE 30 MG/ML
15 INJECTION, SOLUTION INTRAMUSCULAR; INTRAVENOUS ONCE AS NEEDED
OUTPATIENT
Start: 2025-01-01

## 2024-10-17 RX ORDER — DIPHENHYDRAMINE HYDROCHLORIDE 50 MG/ML
50 INJECTION INTRAMUSCULAR; INTRAVENOUS ONCE AS NEEDED
OUTPATIENT
Start: 2025-01-01

## 2024-10-17 RX ADMIN — INCLISIRAN 284 MG: 284 INJECTION, SOLUTION SUBCUTANEOUS at 09:10

## 2024-10-29 ENCOUNTER — PATIENT MESSAGE (OUTPATIENT)
Dept: PRIMARY CARE CLINIC | Facility: CLINIC | Age: 74
End: 2024-10-29
Payer: MEDICARE

## 2024-11-12 ENCOUNTER — LAB VISIT (OUTPATIENT)
Dept: LAB | Facility: HOSPITAL | Age: 74
End: 2024-11-12
Attending: STUDENT IN AN ORGANIZED HEALTH CARE EDUCATION/TRAINING PROGRAM
Payer: MEDICARE

## 2024-11-12 DIAGNOSIS — R73.03 PREDIABETES: ICD-10-CM

## 2024-11-12 DIAGNOSIS — E78.1 HYPERTRIGLYCERIDEMIA: ICD-10-CM

## 2024-11-12 DIAGNOSIS — E03.9 HYPOTHYROIDISM, UNSPECIFIED TYPE: ICD-10-CM

## 2024-11-12 DIAGNOSIS — E55.9 VITAMIN D DEFICIENCY: ICD-10-CM

## 2024-11-12 DIAGNOSIS — N39.41 URGE URINARY INCONTINENCE: ICD-10-CM

## 2024-11-12 DIAGNOSIS — E78.2 MIXED HYPERLIPIDEMIA: ICD-10-CM

## 2024-11-12 DIAGNOSIS — Z00.00 WELLNESS EXAMINATION: ICD-10-CM

## 2024-11-12 LAB
25(OH)D3+25(OH)D2 SERPL-MCNC: 97 NG/ML (ref 30–80)
ALBUMIN SERPL-MCNC: 3.5 G/DL (ref 3.4–4.8)
ALBUMIN/GLOB SERPL: 1.3 RATIO (ref 1.1–2)
ALP SERPL-CCNC: 87 UNIT/L (ref 40–150)
ALT SERPL-CCNC: 15 UNIT/L (ref 0–55)
ANION GAP SERPL CALC-SCNC: 7 MEQ/L
AST SERPL-CCNC: 16 UNIT/L (ref 5–34)
BACTERIA #/AREA URNS AUTO: ABNORMAL /HPF
BASOPHILS # BLD AUTO: 0.05 X10(3)/MCL
BASOPHILS NFR BLD AUTO: 0.8 %
BILIRUB SERPL-MCNC: 0.4 MG/DL
BILIRUB UR QL STRIP.AUTO: NEGATIVE
BUN SERPL-MCNC: 19.4 MG/DL (ref 9.8–20.1)
CALCIUM SERPL-MCNC: 9.3 MG/DL (ref 8.4–10.2)
CHLORIDE SERPL-SCNC: 106 MMOL/L (ref 98–107)
CHOLEST SERPL-MCNC: 145 MG/DL
CHOLEST/HDLC SERPL: 3 {RATIO} (ref 0–5)
CLARITY UR: ABNORMAL
CO2 SERPL-SCNC: 26 MMOL/L (ref 23–31)
COLOR UR AUTO: ABNORMAL
CREAT SERPL-MCNC: 0.87 MG/DL (ref 0.55–1.02)
CREAT/UREA NIT SERPL: 22
EOSINOPHIL # BLD AUTO: 0.5 X10(3)/MCL (ref 0–0.9)
EOSINOPHIL NFR BLD AUTO: 7.9 %
ERYTHROCYTE [DISTWIDTH] IN BLOOD BY AUTOMATED COUNT: 14.8 % (ref 11.5–17)
EST. AVERAGE GLUCOSE BLD GHB EST-MCNC: 128.4 MG/DL
GFR SERPLBLD CREATININE-BSD FMLA CKD-EPI: >60 ML/MIN/1.73/M2
GLOBULIN SER-MCNC: 2.7 GM/DL (ref 2.4–3.5)
GLUCOSE SERPL-MCNC: 97 MG/DL (ref 82–115)
GLUCOSE UR QL STRIP: NEGATIVE
HBA1C MFR BLD: 6.1 %
HCT VFR BLD AUTO: 40.1 % (ref 37–47)
HDLC SERPL-MCNC: 57 MG/DL (ref 35–60)
HGB BLD-MCNC: 12.5 G/DL (ref 12–16)
HGB UR QL STRIP: NEGATIVE
IMM GRANULOCYTES # BLD AUTO: 0.02 X10(3)/MCL (ref 0–0.04)
IMM GRANULOCYTES NFR BLD AUTO: 0.3 %
KETONES UR QL STRIP: NEGATIVE
LDLC SERPL CALC-MCNC: 41 MG/DL (ref 50–140)
LEUKOCYTE ESTERASE UR QL STRIP: NEGATIVE
LYMPHOCYTES # BLD AUTO: 1.47 X10(3)/MCL (ref 0.6–4.6)
LYMPHOCYTES NFR BLD AUTO: 23.1 %
MCH RBC QN AUTO: 29.3 PG (ref 27–31)
MCHC RBC AUTO-ENTMCNC: 31.2 G/DL (ref 33–36)
MCV RBC AUTO: 94.1 FL (ref 80–94)
MONOCYTES # BLD AUTO: 0.52 X10(3)/MCL (ref 0.1–1.3)
MONOCYTES NFR BLD AUTO: 8.2 %
NEUTROPHILS # BLD AUTO: 3.8 X10(3)/MCL (ref 2.1–9.2)
NEUTROPHILS NFR BLD AUTO: 59.7 %
NITRITE UR QL STRIP: NEGATIVE
NRBC BLD AUTO-RTO: 0 %
PH UR STRIP: 6 [PH]
PLATELET # BLD AUTO: 281 X10(3)/MCL (ref 130–400)
PMV BLD AUTO: 9.9 FL (ref 7.4–10.4)
POTASSIUM SERPL-SCNC: 4.1 MMOL/L (ref 3.5–5.1)
PROT SERPL-MCNC: 6.2 GM/DL (ref 5.8–7.6)
PROT UR QL STRIP: NEGATIVE
RBC # BLD AUTO: 4.26 X10(6)/MCL (ref 4.2–5.4)
RBC #/AREA URNS AUTO: ABNORMAL /HPF
SODIUM SERPL-SCNC: 139 MMOL/L (ref 136–145)
SP GR UR STRIP.AUTO: 1.02 (ref 1–1.03)
SQUAMOUS #/AREA URNS LPF: ABNORMAL /HPF
TRIGL SERPL-MCNC: 236 MG/DL (ref 37–140)
TSH SERPL-ACNC: 0.82 UIU/ML (ref 0.35–4.94)
UROBILINOGEN UR STRIP-ACNC: 0.2
VLDLC SERPL CALC-MCNC: 47 MG/DL
WBC # BLD AUTO: 6.36 X10(3)/MCL (ref 4.5–11.5)
WBC #/AREA URNS AUTO: ABNORMAL /HPF

## 2024-11-12 PROCEDURE — 85025 COMPLETE CBC W/AUTO DIFF WBC: CPT

## 2024-11-12 PROCEDURE — 80061 LIPID PANEL: CPT

## 2024-11-12 PROCEDURE — 84443 ASSAY THYROID STIM HORMONE: CPT

## 2024-11-12 PROCEDURE — 83036 HEMOGLOBIN GLYCOSYLATED A1C: CPT

## 2024-11-12 PROCEDURE — 80053 COMPREHEN METABOLIC PANEL: CPT

## 2024-11-12 PROCEDURE — 81015 MICROSCOPIC EXAM OF URINE: CPT

## 2024-11-12 PROCEDURE — 82306 VITAMIN D 25 HYDROXY: CPT

## 2024-11-12 PROCEDURE — 36415 COLL VENOUS BLD VENIPUNCTURE: CPT

## 2024-11-15 ENCOUNTER — OFFICE VISIT (OUTPATIENT)
Dept: PRIMARY CARE CLINIC | Facility: CLINIC | Age: 74
End: 2024-11-15
Payer: MEDICARE

## 2024-11-15 VITALS
TEMPERATURE: 98 F | HEIGHT: 62 IN | HEART RATE: 73 BPM | BODY MASS INDEX: 26.25 KG/M2 | RESPIRATION RATE: 18 BRPM | DIASTOLIC BLOOD PRESSURE: 65 MMHG | OXYGEN SATURATION: 97 % | WEIGHT: 142.63 LBS | SYSTOLIC BLOOD PRESSURE: 133 MMHG

## 2024-11-15 DIAGNOSIS — Z13.6 ENCOUNTER FOR SCREENING FOR CARDIOVASCULAR DISORDERS: ICD-10-CM

## 2024-11-15 DIAGNOSIS — Z00.00 WELLNESS EXAMINATION: Primary | ICD-10-CM

## 2024-11-15 DIAGNOSIS — E78.2 MIXED HYPERLIPIDEMIA: Chronic | ICD-10-CM

## 2024-11-15 DIAGNOSIS — Z12.31 ENCOUNTER FOR SCREENING MAMMOGRAM FOR BREAST CANCER: ICD-10-CM

## 2024-11-15 DIAGNOSIS — I25.10 ARTERIOSCLEROSIS OF CORONARY ARTERY: Chronic | ICD-10-CM

## 2024-11-15 DIAGNOSIS — E78.1 HYPERTRIGLYCERIDEMIA: Chronic | ICD-10-CM

## 2024-11-15 PROCEDURE — G0439 PPPS, SUBSEQ VISIT: HCPCS | Mod: ,,, | Performed by: STUDENT IN AN ORGANIZED HEALTH CARE EDUCATION/TRAINING PROGRAM

## 2024-11-15 NOTE — PROGRESS NOTES
Annual Medicare Wellness Visit     Patient ID: [id number]     Chief Complaint: Medicare AWV    HPI:     Evelyn Frances is a 74 y.o. female here today for a Medicare Wellness. Reviewed labs, answered all questions and concerns at this time. No acute complaints. Doesn't need medication refills.     Opioid Screening: Patient medication list reviewed, patient is not taking prescription opioids. Patient is not using additional opioids than prescribed. Patient is at low risk of substance abuse based on this opioid use history.     -------------------------------------    BPV (benign positional vertigo)    CAD (coronary artery disease)    Diverticulosis    Gastric reflux    Hypercholesterolemia    Hypothyroidism, unspecified    Osteopenia    Postinflammatory pulmonary fibrosis    Tinnitus, unspecified ear    Vitamin D deficiency      Past Surgical History:   Procedure Laterality Date    COLONOSCOPY  09/25/2019    ESOPHAGOGASTRODUODENOSCOPY      EYE SURGERY  1989    hemorrhoidal banding      HYSTERECTOMY      PELVIC FRACTURE SURGERY  08/31/2022     Review of patient's allergies indicates:   Allergen Reactions    Cetirizine      Other Reaction(s): Allergy    Crestor [rosuvastatin] Other (See Comments)     Muscle aches    Metformin Diarrhea    Omnicef [cefdinir] Hives    Pravastatin Other (See Comments)     Other reaction(s): Myalgias (Muscle Pain)    Simvastatin Other (See Comments)     Other reaction(s): Myalgias (Muscle Pain)    Sulfa (sulfonamide antibiotics)     Zetia [ezetimibe] Other (See Comments)     Muscle aches    Amoxicillin Rash     Other reaction(s): Flushed complexion    Azelastine Other (See Comments) and Photosensitivity     Other Reaction(s): Other (See Comments)      Azithromycin Rash, Other (See Comments) and Edema     Other Reaction(s): Cutaneous eruption, Edema, Not Indicated, Other (See Comments)    Dexchlorpheniram-phenylephrine Palpitations    Sulfamethoxazole-trimethoprim Palpitations and Rash      Outpatient Medications Marked as Taking for the 11/15/24 encounter (Office Visit) with Marely Abreu MD   Medication Sig Dispense Refill    carboxymethylcellulose (REFRESH PLUS) 0.5 % Dpet Refresh Tears 0.5 % eye drops   Apply 1 drop every day by ophthalmic route.      clobetasol 0.05% (TEMOVATE) 0.05 % Oint Apply topically.      empagliflozin (JARDIANCE) 10 mg tablet Take 1 tablet (10 mg total) by mouth once daily. 30 tablet 6    esomeprazole (NEXIUM) 20 MG capsule       estradioL (ESTRACE) 1 MG tablet       fluticasone propionate (FLONASE) 50 mcg/actuation nasal spray [place] 1 spray every day by intranasal route.      levothyroxine (SYNTHROID) 100 MCG tablet Take 1 tablet (100 mcg total) by mouth before breakfast. 90 tablet 3     Social History     Socioeconomic History    Marital status:    Tobacco Use    Smoking status: Never    Smokeless tobacco: Never    Tobacco comments:      smoked in our home.   Substance and Sexual Activity    Alcohol use: Not Currently     Alcohol/week: 1.0 standard drink of alcohol     Types: 1 Drinks containing 0.5 oz of alcohol per week    Drug use: Never    Sexual activity: Not Currently     Partners: Male     Birth control/protection: Post-menopausal, Rhythm     Comment: Birth control pills     Social Drivers of Health     Financial Resource Strain: Low Risk  (8/11/2024)    Overall Financial Resource Strain (CARDIA)     Difficulty of Paying Living Expenses: Not very hard   Food Insecurity: No Food Insecurity (8/11/2024)    Hunger Vital Sign     Worried About Running Out of Food in the Last Year: Never true     Ran Out of Food in the Last Year: Never true   Transportation Needs: No Transportation Needs (5/3/2024)    PRAPARE - Transportation     Lack of Transportation (Medical): No     Lack of Transportation (Non-Medical): No   Physical Activity: Insufficiently Active (8/11/2024)    Exercise Vital Sign     Days of Exercise per Week: 1 day     Minutes of  Exercise per Session: 10 min   Stress: No Stress Concern Present (8/11/2024)    [employer] - Occupational Stress Questionnaire     Feeling of Stress : Only a little   Housing Stability: Low Risk  (6/8/2023)    Housing Stability Vital Sign     Unable to Pay for Housing in the Last Year: No     Number of Places Lived in the Last Year: 1     Unstable Housing in the Last Year: No     Family History   Problem Relation Name Age of Onset    Hypertension Mother April Sharp     Miscarriages / Stillbirths Mother April Sharp         Two miscarriages    Early death Father Ramiro Sharp         Massive heart attack age 56    Heart disease Father Ramiro Sharp     Cancer Maternal Aunt Tina Sharp         Breast cancer    Hearing loss Sister April Cedillo         Both ears    Learning disabilities Sister Rebeka Sharp         Born with developmental delays      Patient Care Team:  Marely Abreu MD as PCP - General (Internal Medicine)  Diana Virgen MD as Consulting Physician (Otolaryngology)  Philipp Merlos III, MD as Consulting Physician (Vascular Surgery)  Guy Valdivia Jr., MD as Consulting Physician (Obstetrics and Gynecology)     Subjective:     Review of Systems   Constitutional:  Negative for chills and fever.   Respiratory:  Negative for cough.    Cardiovascular:  Negative for chest pain.   Gastrointestinal:  Negative for abdominal pain, diarrhea, nausea and vomiting.   Genitourinary:  Negative for dysuria and hematuria.   Psychiatric/Behavioral:  Negative for depression.      Patient Reported Health Risk Assessment  What [age]?: 70-79  Are you male or female?: Female  During the past four weeks, how much have you been bothered by emotional problems such as feeling anxious, depressed, irritable, sad, or downhearted and blue?: Not at all  During the past five weeks, has your physical and/or emotional health limited your social  activities with family, friends, neighbors, or groups?: Not at all  During the past four weeks, how much bodily pain have you generally had?: Moderate pain  During the past four weeks, was someone available to help if you needed and wanted help?: Yes, as much as I wanted  During the past four weeks, what was the hardest physical activity you could do for at least two minutes?: Moderate  Can you get to places out of walking distance without help?  (For example, can you travel alone on buses or taxis, or drive your own car?): Yes  Can you go shopping for groceries or clothes without someone's help?: Yes  Can you prepare your own meals?: Yes  Can you do your own housework without help?: Yes  Because of any health problems, do you need the help of another person with your personal care needs such as eating, bathing, dressing, or getting around the house?: No  Can you handle your own money without help?: Yes  During the past four weeks, how would you rate your health in general?: Good  How have things been going for you during the past four weeks?: Pretty well  Are you having difficulties driving your car?: No  Do you always fasten your seat belt when you are in a car?: Yes, usually  How often in the past four weeks have you been bothered by falling or dizzy when standing up?: Never  How often in the past four weeks have you been bothered by sexual problems?: Never  How often in the past four weeks have you been bothered by trouble eating well?: Never  How often in the past four weeks have you been bothered by teeth or denture problems?: Never  How often in the past four weeks have you been bothered with problems using the telephone?: Never  How often in the past four weeks have you been bothered by tiredness or fatigue?: Often  Have you fallen two or more times in the past year?: No  Are you afraid of falling?: No  Are you a smoker?: No  During the past four weeks, how many drinks of wine, beer, or other alcoholic  "beverages did you have?: No alcohol at all  Do you exercise for about 20 minutes three or more days a week?: Yes, some of the time  Have you been given any information to help you with hazards in your house that might hurt you?: Yes  Have you been given any information to help you with keeping track of your medications?: Yes  How often do you have trouble taking medicines the way you've been told to take them?: I always take them as prescribed  How confident are you that you can control and manage most of your health problems?: Very confident  What is your race? (Check all that apply.):     Objective:     /65 (BP Location: Right arm, Patient Position: Sitting)   Pulse 73   Temp 97.9 °F (36.6 °C) (Oral)   Resp 18   Ht 5' 2" (1.575 m)   Wt 64.7 kg (142 lb 9.6 oz)   SpO2 97%   BMI 26.08 kg/m²     Physical Exam  Vitals and nursing note reviewed.   Constitutional:       General: She is not in acute distress.     Appearance: Normal appearance. She is not ill-appearing.   HENT:      Head: Normocephalic.      Nose: Nose normal. No rhinorrhea.      Mouth/Throat:      Mouth: Mucous membranes are moist.   Eyes:      General: No scleral icterus.     Conjunctiva/sclera: Conjunctivae normal.   Cardiovascular:      Rate and Rhythm: Normal rate and regular rhythm.   Pulmonary:      Effort: Pulmonary effort is normal. No respiratory distress.   Musculoskeletal:         General: No deformity.   Skin:     General: Skin is warm and dry.      Coloration: Skin is not jaundiced.   Neurological:      Mental Status: She is alert and oriented to person, place, and time. Mental status is at baseline.      Gait: Gait normal.   Psychiatric:         Mood and Affect: Mood normal.         Behavior: Behavior normal.           11/15/2024    10:00 AM 10/17/2024     8:45 AM 7/15/2024     1:30 PM 5/10/2024    10:20 AM 11/8/2023     9:00 AM 8/8/2023     8:00 AM 6/8/2023     8:20 AM   Fall Risk Assessment - Outpatient   Mobility " Status Ambulatory Ambulatory Ambulatory Ambulatory Ambulatory Ambulatory Ambulatory   Number of falls 0 1 0 0 0 0 0   Identified as fall risk False False False False False False False              Assessment/Plan:     1. Wellness examination  Assessment & Plan:  Routine Health Maintenance   Exercise as tolerated, >30 minutes a day for 3-5 days a week.  Counseled patient on compliance with medications and healthy diet.     Age-Appropriate Screening  Vaccinations:    - Flu: Postponed, patient's preference    - Pneumonia: Completed   - Shingles (>50): Postponed, patient's preference    - Tdap: UTD, 2019   - COVID: 2 dose series completed, boosters     Screening:   - Pap Smear (21-65): Aged out    - Mammogram (40-50 discuss w/ pt, all >50): Ordered today, due in January   - Osteoporosis (all>65, sooner if risk factors): UTD   - Lung Ca. Screening (50-80 if >20 pack yrs current or quit <15 yrs):N/A   - Colon Ca. Screening (age >45): UTD, 2019, every 10 years    - Hep. C: Negative         2. Encounter for screening mammogram for breast cancer  -     Mammo Digital Screening Bilat w/ Marc; Future; Expected date: 01/10/2025    3. Mixed hyperlipidemia  Assessment & Plan:  Last Lipid Panel showed high triglycerides  Unable to tolerate statin therapy due to side effects -myalgias, joint pain  Unable to tolerate Zetia - muscle aches  Given CAD, atherosclerosis of the coronary arteries, repeat CT calcium   Counseled on dietary modifications  Counseled to exercise 150 minutes weekly as exercise raises HDL and lowers LDL       Orders:  -     CT Calcium Scoring Cardiac; Future; Expected date: 11/15/2024    4. Arteriosclerosis of coronary artery  Overview:  5/2016 Cardiac workup with Dr Weber - CT Calcium score (8), normal Echo, normal carotid ICA US bilaterallly, EST negative  Imaging showed atherosclerosis  Unable to tolerate - Statin, Zetia, Levqio    Assessment & Plan:  Stable   Unable to tolerate statins and Zetia  LDL not at  goal   Allergic to Leqvio infusion  Continue to follow up with cardiologist   Currently asymptomatic   Discussed importance of control of LDL, A1c, and BP  Discussed lifestyle modifications including smoking cessation, exercise, weight loss, dietary modifications   ED precautions discussed for chest pain, SOB, syncope/near syncope, jaw pain, palpitations etc.   Repeat CT calcium score       Orders:  -     CT Calcium Scoring Cardiac; Future; Expected date: 11/15/2024    5. Hypertriglyceridemia  Overview:  Omega 3 - GI side effects     Assessment & Plan:  Last lipid panel showed elevated triglycerides   Repeat for wellness visit   Encouraged dietary/lifestyle modifications       6. Encounter for screening for cardiovascular disorders  -     CT Calcium Scoring Cardiac; Future; Expected date: 11/15/2024    Medicare Annual Wellness and Personalized Prevention Plan:   Fall Risk + Home Safety + Hearing Impairment + Depression Screen + Opioid and Substance Abuse Screening + Cognitive Impairment Screen + Health Risk Assessment all reviewed.     Advance Care Planning   I attest to discussing Advance Care Planning with patient and/or family member.  Education was provided including the importance of the Health Care Power of , Advance Directives, and/or [place] documentation.  The patient expressed understanding to the importance of this information and discussion.     Advance Care Planning     Date: 11/17/2024  Patient did not wish or was not able to name a surrogate decision maker or provide an Advance Care Plan.       Follow up in about 6 months (around 5/15/2025) for Thyroid, Diabetes, Lab Results. In addition to their scheduled follow up, the patient has also been instructed to follow up on as needed basis.

## 2024-11-18 ENCOUNTER — TELEPHONE (OUTPATIENT)
Dept: PRIMARY CARE CLINIC | Facility: CLINIC | Age: 74
End: 2024-11-18
Payer: MEDICARE

## 2024-11-18 NOTE — TELEPHONE ENCOUNTER
----- Message from Judie sent at 11/18/2024  8:55 AM CST -----  Regarding: call back  .Who Called: Evelyn Frances    Caller is requesting assistance/information from provider's office.    Symptoms (please be specific):    How long has patient had these symptoms:    List of preferred pharmacies on file (remove unneeded): [unfilled]  If different, enter pharmacy into here including location and phone number:       Preferred Method of Contact: Phone Call  Patient's Preferred Phone Number on File: [phone]   Best Call Back Number, if different:  Additional Information: pt requesting a call back to schedule a [place] appointment to take the flu shot

## 2024-11-18 NOTE — ASSESSMENT & PLAN NOTE
Last lipid panel showed elevated triglycerides   Repeat for wellness visit   Encouraged dietary/lifestyle modifications

## 2024-11-18 NOTE — ASSESSMENT & PLAN NOTE
Stable   Unable to tolerate statins and Zetia  LDL not at goal   Allergic to Leqvio infusion  Continue to follow up with cardiologist   Currently asymptomatic   Discussed importance of control of LDL, A1c, and BP  Discussed lifestyle modifications including smoking cessation, exercise, weight loss, dietary modifications   ED precautions discussed for chest pain, SOB, syncope/near syncope, jaw pain, palpitations etc.   Repeat CT calcium score

## 2024-11-18 NOTE — ASSESSMENT & PLAN NOTE
Routine Health Maintenance   Exercise as tolerated, >30 minutes a day for 3-5 days a week.  Counseled patient on compliance with medications and healthy diet.     Age-Appropriate Screening  Vaccinations:    - Flu: Postponed, patient's preference    - Pneumonia: Completed   - Shingles (>50): Postponed, patient's preference    - Tdap: UTD, 2019   - COVID: 2 dose series completed, boosters     Screening:   - Pap Smear (21-65): Aged out    - Mammogram (40-50 discuss w/ pt, all >50): Ordered today, due in January   - Osteoporosis (all>65, sooner if risk factors): UTD   - Lung Ca. Screening (50-80 if >20 pack yrs current or quit <15 yrs):N/A   - Colon Ca. Screening (age >45): UTD, 2019, every 10 years    - Hep. C: Negative

## 2024-11-18 NOTE — ASSESSMENT & PLAN NOTE
Last Lipid Panel showed high triglycerides  Unable to tolerate statin therapy due to side effects -myalgias, joint pain  Unable to tolerate Zetia - muscle aches  Given CAD, atherosclerosis of the coronary arteries, repeat CT calcium   Counseled on dietary modifications  Counseled to exercise 150 minutes weekly as exercise raises HDL and lowers LDL

## 2024-11-20 ENCOUNTER — CLINICAL SUPPORT (OUTPATIENT)
Dept: PRIMARY CARE CLINIC | Facility: CLINIC | Age: 74
End: 2024-11-20
Payer: MEDICARE

## 2024-11-20 DIAGNOSIS — Z23 NEED FOR INFLUENZA VACCINATION: Primary | ICD-10-CM

## 2024-11-20 PROCEDURE — 90653 IIV ADJUVANT VACCINE IM: CPT | Mod: ,,, | Performed by: STUDENT IN AN ORGANIZED HEALTH CARE EDUCATION/TRAINING PROGRAM

## 2024-11-20 PROCEDURE — G0008 ADMIN INFLUENZA VIRUS VAC: HCPCS | Mod: ,,, | Performed by: STUDENT IN AN ORGANIZED HEALTH CARE EDUCATION/TRAINING PROGRAM

## 2024-11-20 NOTE — PROGRESS NOTES
Pt arrived at office and confirmed name, , and reviewed allergies. Pt denied any allergies or reactions related to influenza vaccine. Influenza vaccine given in L arm without difficulty and pt tolerated well. Pt instructed to wait in lobby for 15 minutes to monitor for adverse reactions. No adverse reactions noted.

## 2025-01-31 LAB — BCS RECOMMENDATION EXT: NORMAL

## 2025-04-13 DIAGNOSIS — R73.03 PREDIABETES: Chronic | ICD-10-CM

## 2025-05-07 ENCOUNTER — TELEPHONE (OUTPATIENT)
Dept: PRIMARY CARE CLINIC | Facility: CLINIC | Age: 75
End: 2025-05-07
Payer: MEDICARE

## 2025-05-07 DIAGNOSIS — R73.03 PREDIABETES: Chronic | ICD-10-CM

## 2025-05-07 DIAGNOSIS — N39.41 URGE URINARY INCONTINENCE: ICD-10-CM

## 2025-05-07 DIAGNOSIS — Z87.440 HISTORY OF RECURRENT UTI (URINARY TRACT INFECTION): ICD-10-CM

## 2025-05-07 DIAGNOSIS — E78.2 MIXED HYPERLIPIDEMIA: Chronic | ICD-10-CM

## 2025-05-07 DIAGNOSIS — E78.1 HYPERTRIGLYCERIDEMIA: Primary | Chronic | ICD-10-CM

## 2025-05-07 DIAGNOSIS — E03.9 HYPOTHYROIDISM, UNSPECIFIED TYPE: ICD-10-CM

## 2025-05-07 NOTE — TELEPHONE ENCOUNTER
Called Pt to confirm appointment on 05/15/2025 and that fasting lab work is needed. Pt verbalized understanding

## 2025-05-09 ENCOUNTER — LAB VISIT (OUTPATIENT)
Dept: LAB | Facility: HOSPITAL | Age: 75
End: 2025-05-09
Attending: STUDENT IN AN ORGANIZED HEALTH CARE EDUCATION/TRAINING PROGRAM
Payer: MEDICARE

## 2025-05-09 DIAGNOSIS — E78.2 MIXED HYPERLIPIDEMIA: Chronic | ICD-10-CM

## 2025-05-09 DIAGNOSIS — Z87.440 HISTORY OF RECURRENT UTI (URINARY TRACT INFECTION): ICD-10-CM

## 2025-05-09 DIAGNOSIS — E78.1 HYPERTRIGLYCERIDEMIA: Chronic | ICD-10-CM

## 2025-05-09 DIAGNOSIS — R73.03 PREDIABETES: Chronic | ICD-10-CM

## 2025-05-09 DIAGNOSIS — E03.9 HYPOTHYROIDISM, UNSPECIFIED TYPE: ICD-10-CM

## 2025-05-09 DIAGNOSIS — N39.41 URGE URINARY INCONTINENCE: ICD-10-CM

## 2025-05-09 LAB
BACTERIA #/AREA URNS AUTO: ABNORMAL /HPF
BILIRUB UR QL STRIP.AUTO: NEGATIVE
CHOLEST SERPL-MCNC: 138 MG/DL
CHOLEST/HDLC SERPL: 3 {RATIO} (ref 0–5)
CLARITY UR: ABNORMAL
COLOR UR AUTO: ABNORMAL
EST. AVERAGE GLUCOSE BLD GHB EST-MCNC: 139.9 MG/DL
GLUCOSE UR QL STRIP: ABNORMAL
HBA1C MFR BLD: 6.5 %
HDLC SERPL-MCNC: 54 MG/DL (ref 35–60)
HGB UR QL STRIP: NEGATIVE
KETONES UR QL STRIP: NEGATIVE
LDLC SERPL CALC-MCNC: 60 MG/DL (ref 50–140)
LEUKOCYTE ESTERASE UR QL STRIP: NEGATIVE
MUCOUS THREADS URNS QL MICRO: ABNORMAL /LPF
NITRITE UR QL STRIP: NEGATIVE
PH UR STRIP: 5.5 [PH]
PROT UR QL STRIP: NEGATIVE
RBC #/AREA URNS AUTO: ABNORMAL /HPF
SP GR UR STRIP.AUTO: 1.02 (ref 1–1.03)
SQUAMOUS #/AREA URNS LPF: ABNORMAL /HPF
TRIGL SERPL-MCNC: 118 MG/DL (ref 37–140)
TSH SERPL-ACNC: 0.81 UIU/ML (ref 0.35–4.94)
UROBILINOGEN UR STRIP-ACNC: NORMAL
VLDLC SERPL CALC-MCNC: 24 MG/DL
WBC #/AREA URNS AUTO: ABNORMAL /HPF

## 2025-05-09 PROCEDURE — 36415 COLL VENOUS BLD VENIPUNCTURE: CPT

## 2025-05-09 PROCEDURE — 81015 MICROSCOPIC EXAM OF URINE: CPT

## 2025-05-09 PROCEDURE — 87086 URINE CULTURE/COLONY COUNT: CPT

## 2025-05-09 PROCEDURE — 80061 LIPID PANEL: CPT

## 2025-05-09 PROCEDURE — 83036 HEMOGLOBIN GLYCOSYLATED A1C: CPT

## 2025-05-09 PROCEDURE — 84443 ASSAY THYROID STIM HORMONE: CPT

## 2025-05-11 LAB — BACTERIA UR CULT: NO GROWTH

## 2025-05-12 ENCOUNTER — RESULTS FOLLOW-UP (OUTPATIENT)
Dept: PRIMARY CARE CLINIC | Facility: CLINIC | Age: 75
End: 2025-05-12

## 2025-05-15 ENCOUNTER — OFFICE VISIT (OUTPATIENT)
Dept: PRIMARY CARE CLINIC | Facility: CLINIC | Age: 75
End: 2025-05-15
Payer: MEDICARE

## 2025-05-15 ENCOUNTER — PATIENT OUTREACH (OUTPATIENT)
Facility: CLINIC | Age: 75
End: 2025-05-15
Payer: MEDICARE

## 2025-05-15 VITALS
HEART RATE: 62 BPM | BODY MASS INDEX: 25.4 KG/M2 | SYSTOLIC BLOOD PRESSURE: 125 MMHG | WEIGHT: 138 LBS | DIASTOLIC BLOOD PRESSURE: 67 MMHG | HEIGHT: 62 IN | RESPIRATION RATE: 16 BRPM | OXYGEN SATURATION: 98 % | TEMPERATURE: 98 F

## 2025-05-15 DIAGNOSIS — E55.9 VITAMIN D DEFICIENCY: Chronic | ICD-10-CM

## 2025-05-15 DIAGNOSIS — Z00.00 WELLNESS EXAMINATION: Chronic | ICD-10-CM

## 2025-05-15 DIAGNOSIS — T46.6X5A MYALGIA DUE TO STATIN: Chronic | ICD-10-CM

## 2025-05-15 DIAGNOSIS — E78.1 HYPERTRIGLYCERIDEMIA: Chronic | ICD-10-CM

## 2025-05-15 DIAGNOSIS — E11.9 TYPE 2 DIABETES MELLITUS WITHOUT COMPLICATION, WITHOUT LONG-TERM CURRENT USE OF INSULIN: Primary | Chronic | ICD-10-CM

## 2025-05-15 DIAGNOSIS — H92.03 OTALGIA OF BOTH EARS: ICD-10-CM

## 2025-05-15 DIAGNOSIS — E78.2 MIXED HYPERLIPIDEMIA: Chronic | ICD-10-CM

## 2025-05-15 DIAGNOSIS — E03.9 HYPOTHYROIDISM, UNSPECIFIED TYPE: Chronic | ICD-10-CM

## 2025-05-15 DIAGNOSIS — M79.10 MYALGIA DUE TO STATIN: Chronic | ICD-10-CM

## 2025-05-15 NOTE — LETTER
AUTHORIZATION FOR RELEASE OF CONFIDENTIAL INFORMATION      05/15/2025      Dear SISSY,    We are seeing Evelyn Frances, date of birth 1950, in the clinic at [facility].  Marely Abreu MD is the patient's PCP. Evelyn Frances has an outstanding lab/procedure at the time we reviewed his chart.  In order to help keep her health information updated, Evelyn has authorized us to request the following medical record(s):        Mammogram           Please fax any records to Marely Abreu MD's at  [phone]    If you have any questions, please contact  Shashi YOUNGBLOOD,CCC @ [phone]             Patient Name: Evelyn Frances  :1950  Patient Phone #:[phone]          Evelyn Frances  MRN: [id number]  : 1950  Age: 74 y.o.  Sex: female         Patient/Legal Guardian Signature  This signature was collected at 10/14/2024           _______________________________   Printed Name/Relationship to Patient      Consent for Examination and Treatment: I hereby authorize the providers and employees of [facility] ([facility]) to provide medical treatment/services which includes, but is not limited to, performing and administering tests and diagnostic procedures that are deemed necessary, including, but not limited to, imaging examinations, blood tests and other laboratory procedures as may be required by the hospital, clinic, or may be ordered by my physician(s) or persons working under the general and/or special instructions of my physician(s).      I understand and agree that this consent covers all authorized persons, including but not limited to physicians, residents, nurse practitioners, physicians' assistants, specialists, consultants, student nurses, and independently contracted physicians, who are called upon by the physician in charge, to carry out the diagnostic procedures and medical or surgical treatment.     I hereby authorize [facility] to retain or dispose of  any specimens or tissue, should there be such remaining from any test or procedure.     I hereby authorize and give consent for [facility] providers and employees to take photographs, images or videotapes of such diagnostic, surgical or treatment procedures of Patient as may be required by [facility] or as may be ordered by a physician. I further acknowledge and agree that [facility] may use cameras or other devices for patient monitoring.     I am aware that the practice of medicine is not an exact science, and I acknowledge that no guarantees have been made to me as to the outcome of any tests, procedures or treatment.     Authorization for Release of Information: I understand that my insurance company and/or their agents may need information necessary to make determinations about payment/reimbursement. I hereby provide authorization to release to all insurance companies, their successors, assignees, other parties with whom they may have contracted, or others acting on their behalf, that are involved with payment for any hospital and/or clinic charges incurred by the patient, any information that they request and deem necessary for payment/reimbursement, and/or quality review.  I further authorize the release of my health information to physicians or other health care practitioners on staff who are involved in my health care now and in the future, and to other health care providers, entities, or institutions for the purpose of my continued care and treatment, including referrals.     REGISTRATION AUTHORIZATION  Form No. 20892 (Rev. 3/25/2024)    Page 1 of 3                       Medicare Patient's Certification and Authorization to Release Information and Payment Request:  I certify that the information given by me in applying for payment under Title XVIII of the Social Security Act is correct. I authorize any brito of medical or other information about me to release to the Social SecurityAdministration, or its  intermediaries or carriers, any information needed for this or a related Medicare claim. I request that payment of authorized benefits be made on my behalf.     Assignment of Insurance Benefits:   I hereby authorize any and all insurance companies, health plans, defined   benefit plans, health insurers or any entity that is or may be responsible for payment of my medical expenses to pay all hospital and medical benefits now due, and to become due and payable to me under any hospital benefits, sick benefits, injury benefits or any other benefit for services rendered to me, including Major Medical Benefits, direct to [facility] and all independently contracted physicians. I assign any and all rights that I may have against any and all insurance companies, health plans, defined benefit plans, health insurers or any entity that is or may be responsible for payment of my medical expenses, including, but not limited to any right to appeal a denial of a claim, any right to bring any action, lawsuit, administrative proceeding, or other cause of action on my behalf. I specifically assign my right to pursue litigation against any and all insurance companies, health plans, defined benefit plans, health insurers or any entity that is or may be responsible for payment of my medical expenses based upon a refusal to pay charges.            E. Valuables: It is understood and agreed that [facility] is not liable for the damage to or loss of any money, jewelry,   documents, dentures, eye glasses, hearing aids, prosthetics, or other property of value.     F. Computer Equipment: I understand and agree that should I choose to use computer equipment owned by [facility] or if I choose to access the Internet via [facility]s network, I do so at my own risk. [facility] is not responsible for any damage to my computer equipment or to any damages of any type that might arise from my loss of equipment or data.     G. Acceptance of Financial Responsibility:   I agree that in consideration of the services and   supplies that have been   or will be furnished to the patient, I am hereby obligated to pay all charges made for or on the account of the patient according to the standard rates (in effect at the time the services and supplies are delivered) established by [facility], including its Patient Financial Assistance Policy to the extent it is applicable. I understand that I am responsible for all charges, or portions thereof, not covered by insurance or other sources. Patient refunds will be distributed only after balances at all [facility] facilities are paid.     H. Communication Authorization:  I hereby authorize [facility] and its representatives, along with any billing service   or  who may work on their behalf, to contact me on   my cell phone and/or home phone using pre- recorded messages, artificial voice messages, automatic telephone dialing devices or other computer assisted technology, or by electronic      mail, text messaging, or by any other form of electronic communication. This includes, but is not limited to, appointment reminders, yearly physical exam reminders, preventive care reminders, patient campaigns, welcome calls, and calls about account balances on my account or any account on which I am listed as a guarantor. I understand I have the right to opt out of these communications at any time.      Relationship  Between  Facility and  Provider:      I understand that some, but not all, providers furnishing services to the patient are not employees or agents of [facility]. The patient is under the care and supervision of his/her attending physician, and it is the responsibility of the facility and its nursing staff to carry out the instructions of such physicians. It is the responsibility of the patient's physician/designee to obtain the patient's informed consent, when required, for medical or surgical treatment, special diagnostic or therapeutic  procedures, or hospital services rendered for the patient under the special instructions of the physician/designee.           REGISTRATION AUTHORIZATION  Form No. 87664 (Rev. 3/25/2024)    Page 2 of 3                       Immunizations: [facility] shares immunization information with state sponsored health departments to help you and your doctor keep track of your immunization records. By signing, you consent to have this information shared with the health department in your state:                                [place] - LINKS ([place] Immunization Network for Kids Statewide)                                [place] - MIIX ([place] Immunization Information eXchange)                                [place] - ImmPRINT (Immunization Patient Registry with Integrated Technology)     TERM: This authorization is valid for this and subsequent care/treatment I receive at [facility] and will remain valid unless/until revoked in writing by me.     [facility]: As used in this document, [facility] means all [facility] owned and managed facilities, including, but not limited to, all health centers, surgery centers, clinics, urgent care centers, and hospitals.         [facility] complies with applicable Federal civil rights laws and does not discriminate on the basis of race, color, national origin, age, disability, or sex.  ATENCIÓN: si habla antoinetteluly, tiene a root disposición servicios gratuitos de asistencia lingüística. Devi al [phone].  CHÚ Ý: N?u b?n nói Ti?ng Vi?t, có các d?ch v? h? tr? ngôn ng? mi?n phí dành cho b?n. G?i s? [phone].        REGISTRATION AUTHORIZATION  Form No. 25752 (Rev. 3/25/2024)   Page 3 of 3

## 2025-05-15 NOTE — PROGRESS NOTES
Health Maintenance Topic(s) Outreach Outcomes & Actions Taken:    Breast Cancer Screening - Outreach Outcomes & Actions Taken  : External Records Requested & Care Team Updated if Applicable         Additional Notes:  Request Mammogram Report: [facility]

## 2025-05-15 NOTE — PROGRESS NOTES
Subjective:       Patient ID: Evelyn Frances is a 75 y.o. female.    -------------------------------------    BPV (benign positional vertigo)    CAD (coronary artery disease)    Diverticulosis    Gastric reflux    Hypercholesterolemia    Hypothyroidism, unspecified    Osteopenia    Postinflammatory pulmonary fibrosis    Tinnitus, unspecified ear    Vitamin D deficiency      Chief Complaint: Diabetes, Thyroid Problem, and Results    History of Present Illness    CHIEF COMPLAINT:  Patient presents today for six-month follow-up and to discuss lab results.    DIABETES:  She reports increased thirst and dry mouth with Jardiance, leading to reduced compliance to twice weekly administration. Previous trial of Metformin resulted in GI issues.    HYPERLIPIDEMIA:  She reports previous trial of statin medication resulted in progressive muscle aches that worsened over time without resolution.    ENT:  She reports fluid in ears with episodes of fullness and expresses concern about an upcoming flight in 1.5 weeks due to its potential impact on ear symptoms. She is unable to take Zyrtec consistently due to dry mouth side effects.    PREVENTIVE CARE:  She has completed her mammogram at the breast center. She denies taking daily aspirin but uses it as needed for pain relief.        Review of Systems   Constitutional:  Negative for chills and fever.   HENT:  Positive for ear pain.    Respiratory:  Negative for cough.    Cardiovascular:  Negative for chest pain.   Gastrointestinal:  Negative for abdominal pain and vomiting.   Genitourinary:  Negative for dysuria and hematuria.   Psychiatric/Behavioral:  Negative for depressed mood.          Objective:      Physical Exam  Vitals and nursing note reviewed.   Constitutional:       General: She is not in acute distress.     Appearance: Normal appearance. She is not ill-appearing.   HENT:      Head: Normocephalic.      Nose: Nose normal.      Mouth/Throat:      Mouth: Mucous membranes  are moist.   Eyes:      General: No scleral icterus.     Conjunctiva/sclera: Conjunctivae normal.   Cardiovascular:      Rate and Rhythm: Normal rate and regular rhythm.   Pulmonary:      Effort: Pulmonary effort is normal. No respiratory distress.   Musculoskeletal:         General: No deformity.   Skin:     General: Skin is warm and dry.      Coloration: Skin is not jaundiced.   Neurological:      Mental Status: She is alert and oriented to person, place, and time. Mental status is at baseline.      Gait: Gait normal.   Psychiatric:         Mood and Affect: Mood normal.         Behavior: Behavior normal.         Assessment & Plan:   Assessment & Plan    E11.9 Type 2 diabetes mellitus without complications  E03.9 Hypothyroidism   E78.1 Hypertriglyceridemia   E78.2 Mixed Hyperlipidemia   M79.10, T46.6X5A Myalgia due to statin   E55.9 Vitamin D Deficiency   H92.03 Otalgia of both ears    IMPRESSION:  A1C increased from 6.1 to 6.5, placing patient in diabetic range.  Cholesterol and triglycerides improved, likely due to fish oil supplementation.  Clarified interpretation of urine test results, explaining presence of skin madelin and its impact on results.    TYPE 2 DIABETES MELLITUS:  - A1C increased from 6.1 to 6.5, confirming type 2 diabetes.  - Patient currently takes Jardiance twice weekly and experiences increased thirst and dry mouth.  - Recommend increasing to daily morning dosage of Jardiance, explaining its diuretic effect (2% incidence of thirst) and additional benefits for kidney and heart protection.  - Patient instructed to contact office if daily dosage becomes problematic.  - Tradjenta will be considered as an alternative if Jardiance side effects persist.    HYPERLIPIDEMIA, HYPERTRIGLYCERIDEMIA, MYALGIA DUE TO STATIN:  - Triglycerides improved from 236 to 118 with fish oil supplementation.  - Cholesterol levels are good without medication; patient has history of adverse reactions to statins.  - Recommend  continuing fish oil for triglyceride management.  - Prescribed daily low-dose aspirin (81 mg, enteric coated) for primary prevention of atherosclerosis, with option to take every other day if gastric distress occurs.    EAR DISCOMFORT:  - Patient experiences fluid in ears causing fullness and hearing issues, especially during flights.  - Suggested strategies for upcoming flight including using nasal spray a few days before and morning of flight, and chewing gum during takeoff to help equalize ear pressure.    HYPOTHYROIDISM:  - Stable, well controlled   - Continue current dose     ADVERSE EFFECTS OF MEDICATIONS:  - Patient experiences dry mouth with Zyrtec and other sinus medications.  - Discussed natural management strategies including sugar-free mints/gum and sour candies to stimulate saliva production.  - Recommend adding electrolyte drinks (sugar-free Propel, Body Armor Light, or Liquid IV) to daily routine for improved hydration.  - Noted history of myalgia with statins and GI issues with Metformin, which should be avoided.  - Tradjenta remains a consideration as alternative to Metformin due to these GI issues.    FOLLOW-UP:  - Ordered mammogram results from breast center.  - Patient to follow up in 6 months for next wellness visit, scheduled for November.         Follow up in about 6 months (around 11/15/2025) for Wellness, Lab Results. In addition to their scheduled follow up, the patient has also been instructed to follow up on as needed basis.    This note was generated with the assistance of ambient listening technology. Verbal consent was obtained by the patient and accompanying visitor(s) for the recording of patient appointment to facilitate this note. I attest to having reviewed and edited the generated note for accuracy, though some syntax or spelling errors may persist. Please contact the author of this note for any clarification.

## 2025-05-23 ENCOUNTER — DOCUMENTATION ONLY (OUTPATIENT)
Dept: PRIMARY CARE CLINIC | Facility: CLINIC | Age: 75
End: 2025-05-23
Payer: MEDICARE

## 2025-06-02 ENCOUNTER — PATIENT MESSAGE (OUTPATIENT)
Dept: PRIMARY CARE CLINIC | Facility: CLINIC | Age: 75
End: 2025-06-02
Payer: MEDICARE

## 2025-06-02 DIAGNOSIS — E11.9 TYPE 2 DIABETES MELLITUS WITHOUT COMPLICATION, WITHOUT LONG-TERM CURRENT USE OF INSULIN: Primary | ICD-10-CM

## 2025-06-03 RX ORDER — LINAGLIPTIN 5 MG/1
5 TABLET, FILM COATED ORAL DAILY
Qty: 90 TABLET | Refills: 3 | Status: SHIPPED | OUTPATIENT
Start: 2025-06-03 | End: 2026-06-03

## 2025-06-12 ENCOUNTER — PATIENT MESSAGE (OUTPATIENT)
Dept: PRIMARY CARE CLINIC | Facility: CLINIC | Age: 75
End: 2025-06-12
Payer: MEDICARE

## 2025-07-02 DIAGNOSIS — E11.9 TYPE 2 DIABETES MELLITUS WITHOUT COMPLICATION, WITHOUT LONG-TERM CURRENT USE OF INSULIN: ICD-10-CM

## 2025-07-03 ENCOUNTER — TELEPHONE (OUTPATIENT)
Dept: FAMILY MEDICINE | Facility: CLINIC | Age: 75
End: 2025-07-03
Payer: MEDICARE

## 2025-07-21 ENCOUNTER — PATIENT MESSAGE (OUTPATIENT)
Dept: PRIMARY CARE CLINIC | Facility: CLINIC | Age: 75
End: 2025-07-21
Payer: MEDICARE

## 2025-08-11 DIAGNOSIS — E03.9 HYPOTHYROIDISM, UNSPECIFIED TYPE: ICD-10-CM

## 2025-08-11 RX ORDER — LEVOTHYROXINE SODIUM 100 UG/1
TABLET ORAL
Qty: 90 TABLET | Refills: 3 | OUTPATIENT
Start: 2025-08-11

## 2025-08-11 RX ORDER — LEVOTHYROXINE SODIUM 100 UG/1
100 TABLET ORAL
Qty: 90 TABLET | Refills: 3 | Status: SHIPPED | OUTPATIENT
Start: 2025-08-11